# Patient Record
Sex: FEMALE | Race: BLACK OR AFRICAN AMERICAN | NOT HISPANIC OR LATINO | Employment: OTHER | RURAL
[De-identification: names, ages, dates, MRNs, and addresses within clinical notes are randomized per-mention and may not be internally consistent; named-entity substitution may affect disease eponyms.]

---

## 2020-10-15 ENCOUNTER — HISTORICAL (OUTPATIENT)
Dept: ADMINISTRATIVE | Facility: HOSPITAL | Age: 85
End: 2020-10-15

## 2020-10-15 LAB
ANION GAP SERPL CALCULATED.3IONS-SCNC: 9.7 MMOL/L (ref 7–16)
BASOPHILS # BLD AUTO: 0.04 X10E3/UL (ref 0–0.2)
BASOPHILS NFR BLD AUTO: 0.3 % (ref 0–1)
BUN SERPL-MCNC: 30 MG/DL (ref 7–18)
CALCIUM SERPL-MCNC: 9.5 MG/DL (ref 8.5–10.1)
CHLORIDE SERPL-SCNC: 107 MMOL/L (ref 98–107)
CO2 SERPL-SCNC: 26 MMOL/L (ref 21–32)
CREAT SERPL-MCNC: 1.96 MG/DL (ref 0.5–1.02)
EOSINOPHIL # BLD AUTO: 0.11 X10E3/UL (ref 0–0.5)
EOSINOPHIL NFR BLD AUTO: 0.9 % (ref 1–4)
ERYTHROCYTE [DISTWIDTH] IN BLOOD BY AUTOMATED COUNT: 13.6 % (ref 11.5–14.5)
GLUCOSE SERPL-MCNC: 128 MG/DL (ref 74–106)
HCT VFR BLD AUTO: 41.2 % (ref 38–47)
HGB BLD-MCNC: 12.7 G/DL (ref 12–16)
IMM GRANULOCYTES # BLD AUTO: 0.06 X10E3/UL (ref 0–0.04)
IMM GRANULOCYTES NFR BLD: 0.5 % (ref 0–0.4)
LYMPHOCYTES # BLD AUTO: 1.47 X10E3/UL (ref 1–4.8)
LYMPHOCYTES NFR BLD AUTO: 11.4 % (ref 27–41)
MCH RBC QN AUTO: 30.5 PG (ref 27–31)
MCHC RBC AUTO-ENTMCNC: 30.8 G/DL (ref 32–36)
MCV RBC AUTO: 98.8 FL (ref 80–96)
MONOCYTES # BLD AUTO: 0.72 X10E3/UL (ref 0–0.8)
MONOCYTES NFR BLD AUTO: 5.6 % (ref 2–6)
MPC BLD CALC-MCNC: 9.3 FL (ref 9.4–12.4)
NEUTROPHILS # BLD AUTO: 10.44 X10E3/UL (ref 1.8–7.7)
NEUTROPHILS NFR BLD AUTO: 81.3 % (ref 53–65)
NRBC # BLD AUTO: 0 X10E3/UL (ref 0–0)
NRBC, AUTO (.00): 0 /100 (ref 0–0)
PLATELET # BLD AUTO: 288 X10E3/UL (ref 150–400)
POTASSIUM SERPL-SCNC: 3.7 MMOL/L (ref 3.5–5.1)
RBC # BLD AUTO: 4.17 X10E6/UL (ref 4.2–5.4)
SODIUM SERPL-SCNC: 139 MMOL/L (ref 136–145)
WBC # BLD AUTO: 12.84 X10E3/UL (ref 4.5–11)

## 2020-10-29 LAB
EST. AVERAGE GLUCOSE BLD GHB EST-MCNC: 130 MG/DL
HBA1C MFR BLD HPLC: 6.5 % (ref 4.5–6.6)

## 2021-05-13 DIAGNOSIS — M25.551 PAIN IN RIGHT HIP: Primary | ICD-10-CM

## 2021-05-17 ENCOUNTER — HOSPITAL ENCOUNTER (OUTPATIENT)
Dept: RADIOLOGY | Facility: HOSPITAL | Age: 86
Discharge: HOME | End: 2021-05-17
Attending: ORTHOPAEDIC SURGERY
Payer: MEDICARE

## 2021-05-17 ENCOUNTER — OFFICE VISIT (OUTPATIENT)
Dept: SPINE | Facility: CLINIC | Age: 86
End: 2021-05-17
Payer: MEDICARE

## 2021-05-17 DIAGNOSIS — Z96.641 HISTORY OF HEMIARTHROPLASTY OF RIGHT HIP: Primary | ICD-10-CM

## 2021-05-17 DIAGNOSIS — M25.551 PAIN IN RIGHT HIP: ICD-10-CM

## 2021-05-17 PROCEDURE — 99213 OFFICE O/P EST LOW 20 MIN: CPT | Mod: S$PBB,,, | Performed by: ORTHOPAEDIC SURGERY

## 2021-05-17 PROCEDURE — 73502 X-RAY EXAM HIP UNI 2-3 VIEWS: CPT | Mod: 26,RT,, | Performed by: ORTHOPAEDIC SURGERY

## 2021-05-17 PROCEDURE — 99213 PR OFFICE/OUTPT VISIT, EST, LEVL III, 20-29 MIN: ICD-10-PCS | Mod: S$PBB,,, | Performed by: ORTHOPAEDIC SURGERY

## 2021-05-17 PROCEDURE — 73502 XR HIP 2 VIEW RIGHT: ICD-10-PCS | Mod: 26,RT,, | Performed by: ORTHOPAEDIC SURGERY

## 2021-05-17 PROCEDURE — 99212 OFFICE O/P EST SF 10 MIN: CPT | Mod: PBBFAC,25 | Performed by: ORTHOPAEDIC SURGERY

## 2021-05-17 PROCEDURE — 73502 X-RAY EXAM HIP UNI 2-3 VIEWS: CPT | Mod: TC,RT

## 2021-05-17 RX ORDER — DILTIAZEM HYDROCHLORIDE 300 MG/1
300 CAPSULE, COATED, EXTENDED RELEASE ORAL DAILY
COMMUNITY
Start: 2021-03-10 | End: 2021-06-07 | Stop reason: SDUPTHER

## 2021-05-17 RX ORDER — LISINOPRIL AND HYDROCHLOROTHIAZIDE 12.5; 2 MG/1; MG/1
1 TABLET ORAL DAILY
COMMUNITY
Start: 2021-04-23 | End: 2021-06-07 | Stop reason: SDUPTHER

## 2021-05-17 RX ORDER — PANTOPRAZOLE SODIUM 40 MG/1
40 TABLET, DELAYED RELEASE ORAL DAILY
COMMUNITY
Start: 2021-03-09 | End: 2021-06-07 | Stop reason: SDUPTHER

## 2021-06-07 ENCOUNTER — OFFICE VISIT (OUTPATIENT)
Dept: FAMILY MEDICINE | Facility: CLINIC | Age: 86
End: 2021-06-07
Payer: MEDICARE

## 2021-06-07 VITALS
BODY MASS INDEX: 34.63 KG/M2 | RESPIRATION RATE: 18 BRPM | HEIGHT: 62 IN | DIASTOLIC BLOOD PRESSURE: 84 MMHG | OXYGEN SATURATION: 97 % | SYSTOLIC BLOOD PRESSURE: 138 MMHG | HEART RATE: 93 BPM | TEMPERATURE: 98 F | WEIGHT: 188.19 LBS

## 2021-06-07 DIAGNOSIS — H54.8 LEGALLY BLIND: ICD-10-CM

## 2021-06-07 DIAGNOSIS — H91.90 HEARING LOSS, UNSPECIFIED HEARING LOSS TYPE, UNSPECIFIED LATERALITY: ICD-10-CM

## 2021-06-07 DIAGNOSIS — M25.551 RIGHT HIP PAIN: ICD-10-CM

## 2021-06-07 DIAGNOSIS — F32.5 MAJOR DEPRESSIVE DISORDER WITH SINGLE EPISODE, IN FULL REMISSION: ICD-10-CM

## 2021-06-07 DIAGNOSIS — K21.9 GASTROESOPHAGEAL REFLUX DISEASE WITHOUT ESOPHAGITIS: ICD-10-CM

## 2021-06-07 DIAGNOSIS — I10 ESSENTIAL HYPERTENSION: Primary | ICD-10-CM

## 2021-06-07 LAB
ALBUMIN SERPL BCP-MCNC: 3.7 G/DL (ref 3.5–5)
ALBUMIN/GLOB SERPL: 0.9 {RATIO}
ALP SERPL-CCNC: 92 U/L (ref 55–142)
ALT SERPL W P-5'-P-CCNC: 16 U/L (ref 13–56)
ANION GAP SERPL CALCULATED.3IONS-SCNC: 11 MMOL/L (ref 7–16)
AST SERPL W P-5'-P-CCNC: 16 U/L (ref 15–37)
BASOPHILS # BLD AUTO: 0.02 K/UL (ref 0–0.2)
BASOPHILS NFR BLD AUTO: 0.3 % (ref 0–1)
BILIRUB SERPL-MCNC: 0.5 MG/DL (ref 0–1.2)
BUN SERPL-MCNC: 22 MG/DL (ref 7–18)
BUN/CREAT SERPL: 14 (ref 6–20)
CALCIUM SERPL-MCNC: 9.1 MG/DL (ref 8.5–10.1)
CHLORIDE SERPL-SCNC: 109 MMOL/L (ref 98–107)
CO2 SERPL-SCNC: 24 MMOL/L (ref 21–32)
CREAT SERPL-MCNC: 1.61 MG/DL (ref 0.55–1.02)
DIFFERENTIAL METHOD BLD: ABNORMAL
EOSINOPHIL # BLD AUTO: 0.07 K/UL (ref 0–0.5)
EOSINOPHIL NFR BLD AUTO: 1 % (ref 1–4)
ERYTHROCYTE [DISTWIDTH] IN BLOOD BY AUTOMATED COUNT: 14.5 % (ref 11.5–14.5)
GLOBULIN SER-MCNC: 4.1 G/DL (ref 2–4)
GLUCOSE SERPL-MCNC: 136 MG/DL (ref 74–106)
HCT VFR BLD AUTO: 40.6 % (ref 38–47)
HGB BLD-MCNC: 13.1 G/DL (ref 12–16)
IMM GRANULOCYTES # BLD AUTO: 0.01 K/UL (ref 0–0.04)
IMM GRANULOCYTES NFR BLD: 0.1 % (ref 0–0.4)
LYMPHOCYTES # BLD AUTO: 1.93 K/UL (ref 1–4.8)
LYMPHOCYTES NFR BLD AUTO: 28 % (ref 27–41)
MCH RBC QN AUTO: 31.3 PG (ref 27–31)
MCHC RBC AUTO-ENTMCNC: 32.3 G/DL (ref 32–36)
MCV RBC AUTO: 97.1 FL (ref 80–96)
MONOCYTES # BLD AUTO: 0.53 K/UL (ref 0–0.8)
MONOCYTES NFR BLD AUTO: 7.7 % (ref 2–6)
MPC BLD CALC-MCNC: 10.9 FL (ref 9.4–12.4)
NEUTROPHILS # BLD AUTO: 4.34 K/UL (ref 1.8–7.7)
NEUTROPHILS NFR BLD AUTO: 62.9 % (ref 53–65)
NRBC # BLD AUTO: 0 X10E3/UL
NRBC, AUTO (.00): 0 %
PLATELET # BLD AUTO: 222 K/UL (ref 150–400)
POTASSIUM SERPL-SCNC: 4.1 MMOL/L (ref 3.5–5.1)
PROT SERPL-MCNC: 7.8 G/DL (ref 6.4–8.2)
RBC # BLD AUTO: 4.18 M/UL (ref 4.2–5.4)
SODIUM SERPL-SCNC: 140 MMOL/L (ref 136–145)
WBC # BLD AUTO: 6.9 K/UL (ref 4.5–11)

## 2021-06-07 PROCEDURE — 85025 COMPLETE CBC W/AUTO DIFF WBC: CPT | Mod: ,,, | Performed by: CLINICAL MEDICAL LABORATORY

## 2021-06-07 PROCEDURE — 80053 COMPREHENSIVE METABOLIC PANEL: ICD-10-PCS | Mod: ,,, | Performed by: CLINICAL MEDICAL LABORATORY

## 2021-06-07 PROCEDURE — 99214 OFFICE O/P EST MOD 30 MIN: CPT | Mod: ,,, | Performed by: FAMILY MEDICINE

## 2021-06-07 PROCEDURE — 80053 COMPREHEN METABOLIC PANEL: CPT | Mod: ,,, | Performed by: CLINICAL MEDICAL LABORATORY

## 2021-06-07 PROCEDURE — 99214 PR OFFICE/OUTPT VISIT, EST, LEVL IV, 30-39 MIN: ICD-10-PCS | Mod: ,,, | Performed by: FAMILY MEDICINE

## 2021-06-07 PROCEDURE — 85025 CBC WITH DIFFERENTIAL: ICD-10-PCS | Mod: ,,, | Performed by: CLINICAL MEDICAL LABORATORY

## 2021-06-07 RX ORDER — CITALOPRAM 10 MG/1
10 TABLET ORAL DAILY
Qty: 90 TABLET | Refills: 3 | Status: SHIPPED | OUTPATIENT
Start: 2021-06-07 | End: 2021-11-04

## 2021-06-07 RX ORDER — FUROSEMIDE 20 MG/1
20 TABLET ORAL DAILY
Qty: 90 TABLET | Refills: 1 | Status: SHIPPED | OUTPATIENT
Start: 2021-06-07 | End: 2021-11-04

## 2021-06-07 RX ORDER — LIDOCAINE 50 MG/G
1 PATCH TOPICAL DAILY
Qty: 90 PATCH | Refills: 3 | Status: SHIPPED | OUTPATIENT
Start: 2021-06-07 | End: 2021-09-05

## 2021-06-07 RX ORDER — PANTOPRAZOLE SODIUM 40 MG/1
40 TABLET, DELAYED RELEASE ORAL DAILY
Qty: 90 TABLET | Refills: 1 | Status: SHIPPED | OUTPATIENT
Start: 2021-06-07 | End: 2021-12-13 | Stop reason: SDUPTHER

## 2021-06-07 RX ORDER — FUROSEMIDE 20 MG/1
20 TABLET ORAL DAILY
COMMUNITY
End: 2021-06-07 | Stop reason: SDUPTHER

## 2021-06-07 RX ORDER — POTASSIUM CHLORIDE 750 MG/1
10 CAPSULE, EXTENDED RELEASE ORAL ONCE
Qty: 90 CAPSULE | Refills: 1 | Status: SHIPPED | OUTPATIENT
Start: 2021-06-07 | End: 2021-06-07

## 2021-06-07 RX ORDER — LISINOPRIL AND HYDROCHLOROTHIAZIDE 12.5; 2 MG/1; MG/1
1 TABLET ORAL DAILY
Qty: 90 TABLET | Refills: 1 | Status: SHIPPED | OUTPATIENT
Start: 2021-06-07 | End: 2021-12-13 | Stop reason: SDUPTHER

## 2021-06-07 RX ORDER — DILTIAZEM HYDROCHLORIDE 300 MG/1
300 CAPSULE, COATED, EXTENDED RELEASE ORAL DAILY
Qty: 90 CAPSULE | Refills: 1 | Status: SHIPPED | OUTPATIENT
Start: 2021-06-07 | End: 2021-12-13 | Stop reason: SDUPTHER

## 2021-07-01 ENCOUNTER — PATIENT MESSAGE (OUTPATIENT)
Dept: ADMINISTRATIVE | Facility: OTHER | Age: 86
End: 2021-07-01

## 2021-10-18 ENCOUNTER — TELEPHONE (OUTPATIENT)
Dept: FAMILY MEDICINE | Facility: CLINIC | Age: 86
End: 2021-10-18
Payer: MEDICARE

## 2021-10-18 RX ORDER — AMOXICILLIN AND CLAVULANATE POTASSIUM 875; 125 MG/1; MG/1
1 TABLET, FILM COATED ORAL EVERY 12 HOURS
COMMUNITY
End: 2021-12-13

## 2021-10-18 RX ORDER — MULTIVIT,IRON,MINERALS/LUTEIN
1 TABLET ORAL DAILY
COMMUNITY

## 2021-11-04 ENCOUNTER — OFFICE VISIT (OUTPATIENT)
Dept: FAMILY MEDICINE | Facility: CLINIC | Age: 86
End: 2021-11-04
Payer: MEDICARE

## 2021-11-04 VITALS
WEIGHT: 178.56 LBS | RESPIRATION RATE: 18 BRPM | BODY MASS INDEX: 30.48 KG/M2 | DIASTOLIC BLOOD PRESSURE: 59 MMHG | HEIGHT: 64 IN | OXYGEN SATURATION: 97 % | SYSTOLIC BLOOD PRESSURE: 129 MMHG | HEART RATE: 66 BPM | TEMPERATURE: 98 F

## 2021-11-04 DIAGNOSIS — H54.8 LEGALLY BLIND: ICD-10-CM

## 2021-11-04 DIAGNOSIS — F02.818 LATE ONSET ALZHEIMER'S DEMENTIA WITH BEHAVIORAL DISTURBANCE: ICD-10-CM

## 2021-11-04 DIAGNOSIS — I10 ESSENTIAL HYPERTENSION: ICD-10-CM

## 2021-11-04 DIAGNOSIS — G30.1 LATE ONSET ALZHEIMER'S DEMENTIA WITH BEHAVIORAL DISTURBANCE: ICD-10-CM

## 2021-11-04 DIAGNOSIS — H70.91 MASTOIDITIS OF RIGHT SIDE: Primary | ICD-10-CM

## 2021-11-04 DIAGNOSIS — H91.90 HEARING LOSS, UNSPECIFIED HEARING LOSS TYPE, UNSPECIFIED LATERALITY: ICD-10-CM

## 2021-11-04 PROCEDURE — 99495 TCM SERVICES (MODERATE COMPLEXITY): ICD-10-PCS | Mod: ,,, | Performed by: FAMILY MEDICINE

## 2021-11-04 PROCEDURE — 99495 TRANSJ CARE MGMT MOD F2F 14D: CPT | Mod: ,,, | Performed by: FAMILY MEDICINE

## 2021-11-04 RX ORDER — CLINDAMYCIN HYDROCHLORIDE 300 MG/1
300 CAPSULE ORAL 3 TIMES DAILY
Qty: 15 CAPSULE | Refills: 0 | Status: SHIPPED | OUTPATIENT
Start: 2021-11-04 | End: 2021-11-09

## 2021-12-13 ENCOUNTER — OFFICE VISIT (OUTPATIENT)
Dept: FAMILY MEDICINE | Facility: CLINIC | Age: 86
End: 2021-12-13
Payer: MEDICARE

## 2021-12-13 VITALS
WEIGHT: 184.19 LBS | RESPIRATION RATE: 20 BRPM | SYSTOLIC BLOOD PRESSURE: 132 MMHG | OXYGEN SATURATION: 99 % | HEART RATE: 66 BPM | TEMPERATURE: 97 F | DIASTOLIC BLOOD PRESSURE: 62 MMHG | BODY MASS INDEX: 31.45 KG/M2 | HEIGHT: 64 IN

## 2021-12-13 DIAGNOSIS — K21.9 GASTROESOPHAGEAL REFLUX DISEASE WITHOUT ESOPHAGITIS: ICD-10-CM

## 2021-12-13 DIAGNOSIS — I10 ESSENTIAL HYPERTENSION: ICD-10-CM

## 2021-12-13 DIAGNOSIS — R73.9 HYPERGLYCEMIA: Primary | ICD-10-CM

## 2021-12-13 LAB
ALBUMIN SERPL BCP-MCNC: 3.2 G/DL (ref 3.5–5)
ALBUMIN/GLOB SERPL: 0.7 {RATIO}
ALP SERPL-CCNC: 87 U/L (ref 55–142)
ALT SERPL W P-5'-P-CCNC: 18 U/L (ref 13–56)
ANION GAP SERPL CALCULATED.3IONS-SCNC: 12 MMOL/L (ref 7–16)
AST SERPL W P-5'-P-CCNC: 18 U/L (ref 15–37)
BILIRUB SERPL-MCNC: 0.3 MG/DL (ref 0–1.2)
BUN SERPL-MCNC: 21 MG/DL (ref 7–18)
BUN/CREAT SERPL: 13 (ref 6–20)
CALCIUM SERPL-MCNC: 9 MG/DL (ref 8.5–10.1)
CHLORIDE SERPL-SCNC: 112 MMOL/L (ref 98–107)
CHOLEST SERPL-MCNC: 244 MG/DL (ref 0–200)
CHOLEST/HDLC SERPL: 4.9 {RATIO}
CO2 SERPL-SCNC: 23 MMOL/L (ref 21–32)
CREAT SERPL-MCNC: 1.6 MG/DL (ref 0.55–1.02)
EST. AVERAGE GLUCOSE BLD GHB EST-MCNC: 114 MG/DL
GLOBULIN SER-MCNC: 4.3 G/DL (ref 2–4)
GLUCOSE SERPL-MCNC: 129 MG/DL (ref 74–106)
HBA1C MFR BLD HPLC: 6 % (ref 4.5–6.6)
HDLC SERPL-MCNC: 50 MG/DL (ref 40–60)
LDLC SERPL CALC-MCNC: 166 MG/DL
LDLC/HDLC SERPL: 3.3 {RATIO}
NONHDLC SERPL-MCNC: 194 MG/DL
POTASSIUM SERPL-SCNC: 4.2 MMOL/L (ref 3.5–5.1)
PROT SERPL-MCNC: 7.5 G/DL (ref 6.4–8.2)
SODIUM SERPL-SCNC: 143 MMOL/L (ref 136–145)
TRIGL SERPL-MCNC: 140 MG/DL (ref 35–150)
VLDLC SERPL-MCNC: 28 MG/DL

## 2021-12-13 PROCEDURE — 99214 OFFICE O/P EST MOD 30 MIN: CPT | Mod: ,,, | Performed by: FAMILY MEDICINE

## 2021-12-13 PROCEDURE — 80053 COMPREHEN METABOLIC PANEL: CPT | Mod: ,,, | Performed by: CLINICAL MEDICAL LABORATORY

## 2021-12-13 PROCEDURE — 80061 LIPID PANEL: ICD-10-PCS | Mod: ,,, | Performed by: CLINICAL MEDICAL LABORATORY

## 2021-12-13 PROCEDURE — 99214 PR OFFICE/OUTPT VISIT, EST, LEVL IV, 30-39 MIN: ICD-10-PCS | Mod: ,,, | Performed by: FAMILY MEDICINE

## 2021-12-13 PROCEDURE — 83036 HEMOGLOBIN A1C: ICD-10-PCS | Mod: ,,, | Performed by: CLINICAL MEDICAL LABORATORY

## 2021-12-13 PROCEDURE — 83036 HEMOGLOBIN GLYCOSYLATED A1C: CPT | Mod: ,,, | Performed by: CLINICAL MEDICAL LABORATORY

## 2021-12-13 PROCEDURE — 80061 LIPID PANEL: CPT | Mod: ,,, | Performed by: CLINICAL MEDICAL LABORATORY

## 2021-12-13 PROCEDURE — 80053 COMPREHENSIVE METABOLIC PANEL: ICD-10-PCS | Mod: ,,, | Performed by: CLINICAL MEDICAL LABORATORY

## 2021-12-13 RX ORDER — PANTOPRAZOLE SODIUM 40 MG/1
40 TABLET, DELAYED RELEASE ORAL DAILY
Qty: 90 TABLET | Refills: 1 | Status: SHIPPED | OUTPATIENT
Start: 2021-12-13 | End: 2022-09-01 | Stop reason: SDUPTHER

## 2021-12-13 RX ORDER — DILTIAZEM HYDROCHLORIDE 300 MG/1
300 CAPSULE, COATED, EXTENDED RELEASE ORAL DAILY
Qty: 90 CAPSULE | Refills: 1 | Status: SHIPPED | OUTPATIENT
Start: 2021-12-13 | End: 2022-09-01 | Stop reason: SDUPTHER

## 2021-12-13 RX ORDER — LISINOPRIL AND HYDROCHLOROTHIAZIDE 12.5; 2 MG/1; MG/1
1 TABLET ORAL DAILY
Qty: 90 TABLET | Refills: 1 | Status: SHIPPED | OUTPATIENT
Start: 2021-12-13 | End: 2022-09-01 | Stop reason: SDUPTHER

## 2022-03-11 DIAGNOSIS — Z71.89 COMPLEX CARE COORDINATION: ICD-10-CM

## 2022-04-30 ENCOUNTER — EXTERNAL CHRONIC CARE MANAGEMENT (OUTPATIENT)
Dept: FAMILY MEDICINE | Facility: CLINIC | Age: 87
End: 2022-04-30
Payer: MEDICARE

## 2022-04-30 PROCEDURE — G0511 CCM/BHI BY RHC/FQHC 20MIN MO: HCPCS | Mod: ,,, | Performed by: FAMILY MEDICINE

## 2022-04-30 PROCEDURE — G0511 PR CHRONIC CARE MGMT, RHC OR FQHC ONLY, 20 MINS OR MORE: ICD-10-PCS | Mod: ,,, | Performed by: FAMILY MEDICINE

## 2022-05-31 ENCOUNTER — EXTERNAL CHRONIC CARE MANAGEMENT (OUTPATIENT)
Dept: FAMILY MEDICINE | Facility: CLINIC | Age: 87
End: 2022-05-31
Payer: MEDICARE

## 2022-05-31 PROCEDURE — G0511 PR CHRONIC CARE MGMT, RHC OR FQHC ONLY, 20 MINS OR MORE: ICD-10-PCS | Mod: ,,, | Performed by: FAMILY MEDICINE

## 2022-05-31 PROCEDURE — G0511 CCM/BHI BY RHC/FQHC 20MIN MO: HCPCS | Mod: ,,, | Performed by: FAMILY MEDICINE

## 2022-06-09 ENCOUNTER — TELEPHONE (OUTPATIENT)
Dept: FAMILY MEDICINE | Facility: CLINIC | Age: 87
End: 2022-06-09
Payer: MEDICARE

## 2022-06-09 NOTE — TELEPHONE ENCOUNTER
Yuli Sanders Staff  Phone Number: [phone]     Good morning,     I spoke with Ajay's daughter, Rach, this morning and completed Ajay's Health Assessment and leda monthly health check call. Since Dr. Huddleston is no longer at the practice, she would like to know which provider Ajay will be assigned to.  Could someone please call to discuss with Rach.     Thank you and please let me know if you have any questions.     Yuli Peñaloza LPN   Chronic Care Coordinator   [facility]   [phone] Ext. [phone]

## 2022-06-30 ENCOUNTER — EXTERNAL CHRONIC CARE MANAGEMENT (OUTPATIENT)
Dept: FAMILY MEDICINE | Facility: CLINIC | Age: 87
End: 2022-06-30
Payer: MEDICARE

## 2022-06-30 PROCEDURE — G0511 CCM/BHI BY RHC/FQHC 20MIN MO: HCPCS | Mod: ,,, | Performed by: FAMILY MEDICINE

## 2022-06-30 PROCEDURE — G0511 PR CHRONIC CARE MGMT, RHC OR FQHC ONLY, 20 MINS OR MORE: ICD-10-PCS | Mod: ,,, | Performed by: FAMILY MEDICINE

## 2022-07-31 ENCOUNTER — EXTERNAL CHRONIC CARE MANAGEMENT (OUTPATIENT)
Dept: FAMILY MEDICINE | Facility: CLINIC | Age: 87
End: 2022-07-31
Payer: MEDICARE

## 2022-07-31 PROCEDURE — G0511 CCM/BHI BY RHC/FQHC 20MIN MO: HCPCS | Mod: ,,, | Performed by: FAMILY MEDICINE

## 2022-07-31 PROCEDURE — G0511 PR CHRONIC CARE MGMT, RHC OR FQHC ONLY, 20 MINS OR MORE: ICD-10-PCS | Mod: ,,, | Performed by: FAMILY MEDICINE

## 2022-08-31 ENCOUNTER — EXTERNAL CHRONIC CARE MANAGEMENT (OUTPATIENT)
Dept: FAMILY MEDICINE | Facility: CLINIC | Age: 87
End: 2022-08-31
Payer: MEDICARE

## 2022-08-31 PROCEDURE — G0511 CCM/BHI BY RHC/FQHC 20MIN MO: HCPCS | Mod: ,,, | Performed by: FAMILY MEDICINE

## 2022-08-31 PROCEDURE — G0511 PR CHRONIC CARE MGMT, RHC OR FQHC ONLY, 20 MINS OR MORE: ICD-10-PCS | Mod: ,,, | Performed by: FAMILY MEDICINE

## 2022-09-01 ENCOUNTER — OFFICE VISIT (OUTPATIENT)
Dept: FAMILY MEDICINE | Facility: CLINIC | Age: 87
End: 2022-09-01
Payer: MEDICARE

## 2022-09-01 VITALS
DIASTOLIC BLOOD PRESSURE: 64 MMHG | HEART RATE: 82 BPM | HEIGHT: 64 IN | OXYGEN SATURATION: 98 % | TEMPERATURE: 98 F | WEIGHT: 172.81 LBS | SYSTOLIC BLOOD PRESSURE: 110 MMHG | BODY MASS INDEX: 29.5 KG/M2

## 2022-09-01 DIAGNOSIS — R73.09 ELEVATED HEMOGLOBIN A1C: ICD-10-CM

## 2022-09-01 DIAGNOSIS — K21.9 GASTROESOPHAGEAL REFLUX DISEASE WITHOUT ESOPHAGITIS: ICD-10-CM

## 2022-09-01 DIAGNOSIS — I10 ESSENTIAL HYPERTENSION: Primary | ICD-10-CM

## 2022-09-01 LAB
ALBUMIN SERPL BCP-MCNC: 3.5 G/DL (ref 3.5–5)
ALBUMIN/GLOB SERPL: 0.8 {RATIO}
ALP SERPL-CCNC: 92 U/L (ref 55–142)
ALT SERPL W P-5'-P-CCNC: 19 U/L (ref 13–56)
ANION GAP SERPL CALCULATED.3IONS-SCNC: 13 MMOL/L (ref 7–16)
AST SERPL W P-5'-P-CCNC: 18 U/L (ref 15–37)
BASOPHILS # BLD AUTO: 0.01 K/UL (ref 0–0.2)
BASOPHILS NFR BLD AUTO: 0.1 % (ref 0–1)
BILIRUB SERPL-MCNC: 0.4 MG/DL (ref ?–1.2)
BUN SERPL-MCNC: 20 MG/DL (ref 7–18)
BUN/CREAT SERPL: 14 (ref 6–20)
CALCIUM SERPL-MCNC: 9.3 MG/DL (ref 8.5–10.1)
CHLORIDE SERPL-SCNC: 109 MMOL/L (ref 98–107)
CHOLEST SERPL-MCNC: 232 MG/DL (ref 0–200)
CHOLEST/HDLC SERPL: 4.5 {RATIO}
CO2 SERPL-SCNC: 23 MMOL/L (ref 21–32)
CREAT SERPL-MCNC: 1.46 MG/DL (ref 0.55–1.02)
DIFFERENTIAL METHOD BLD: ABNORMAL
EGFR (NO RACE VARIABLE) (RUSH/TITUS): 32 ML/MIN/1.73M²
EOSINOPHIL # BLD AUTO: 0.04 K/UL (ref 0–0.5)
EOSINOPHIL NFR BLD AUTO: 0.6 % (ref 1–4)
ERYTHROCYTE [DISTWIDTH] IN BLOOD BY AUTOMATED COUNT: 14.5 % (ref 11.5–14.5)
EST. AVERAGE GLUCOSE BLD GHB EST-MCNC: 110 MG/DL
GLOBULIN SER-MCNC: 4.3 G/DL (ref 2–4)
GLUCOSE SERPL-MCNC: 129 MG/DL (ref 74–106)
HBA1C MFR BLD HPLC: 5.9 % (ref 4.5–6.6)
HCT VFR BLD AUTO: 41.9 % (ref 38–47)
HDLC SERPL-MCNC: 52 MG/DL (ref 40–60)
HGB BLD-MCNC: 13.8 G/DL (ref 12–16)
IMM GRANULOCYTES # BLD AUTO: 0.02 K/UL (ref 0–0.04)
IMM GRANULOCYTES NFR BLD: 0.3 % (ref 0–0.4)
LDLC SERPL CALC-MCNC: 150 MG/DL
LDLC/HDLC SERPL: 2.9 {RATIO}
LYMPHOCYTES # BLD AUTO: 2.07 K/UL (ref 1–4.8)
LYMPHOCYTES NFR BLD AUTO: 30.8 % (ref 27–41)
MCH RBC QN AUTO: 32.9 PG (ref 27–31)
MCHC RBC AUTO-ENTMCNC: 32.9 G/DL (ref 32–36)
MCV RBC AUTO: 100 FL (ref 80–96)
MONOCYTES # BLD AUTO: 0.43 K/UL (ref 0–0.8)
MONOCYTES NFR BLD AUTO: 6.4 % (ref 2–6)
MPC BLD CALC-MCNC: 10.7 FL (ref 9.4–12.4)
NEUTROPHILS # BLD AUTO: 4.15 K/UL (ref 1.8–7.7)
NEUTROPHILS NFR BLD AUTO: 61.8 % (ref 53–65)
NONHDLC SERPL-MCNC: 180 MG/DL
NRBC # BLD AUTO: 0 X10E3/UL
NRBC, AUTO (.00): 0 %
PLATELET # BLD AUTO: 234 K/UL (ref 150–400)
POTASSIUM SERPL-SCNC: 4 MMOL/L (ref 3.5–5.1)
PROT SERPL-MCNC: 7.8 G/DL (ref 6.4–8.2)
RBC # BLD AUTO: 4.19 M/UL (ref 4.2–5.4)
SODIUM SERPL-SCNC: 141 MMOL/L (ref 136–145)
TRIGL SERPL-MCNC: 151 MG/DL (ref 35–150)
VLDLC SERPL-MCNC: 30 MG/DL
WBC # BLD AUTO: 6.72 K/UL (ref 4.5–11)

## 2022-09-01 PROCEDURE — 83036 HEMOGLOBIN A1C: ICD-10-PCS | Mod: ,,, | Performed by: CLINICAL MEDICAL LABORATORY

## 2022-09-01 PROCEDURE — 99213 PR OFFICE/OUTPT VISIT, EST, LEVL III, 20-29 MIN: ICD-10-PCS | Mod: ,,, | Performed by: NURSE PRACTITIONER

## 2022-09-01 PROCEDURE — 83036 HEMOGLOBIN GLYCOSYLATED A1C: CPT | Mod: ,,, | Performed by: CLINICAL MEDICAL LABORATORY

## 2022-09-01 PROCEDURE — 80061 LIPID PANEL: ICD-10-PCS | Mod: ,,, | Performed by: CLINICAL MEDICAL LABORATORY

## 2022-09-01 PROCEDURE — 99213 OFFICE O/P EST LOW 20 MIN: CPT | Mod: ,,, | Performed by: NURSE PRACTITIONER

## 2022-09-01 PROCEDURE — 80053 COMPREHEN METABOLIC PANEL: CPT | Mod: ,,, | Performed by: CLINICAL MEDICAL LABORATORY

## 2022-09-01 PROCEDURE — 85025 COMPLETE CBC W/AUTO DIFF WBC: CPT | Mod: ,,, | Performed by: CLINICAL MEDICAL LABORATORY

## 2022-09-01 PROCEDURE — 85025 CBC WITH DIFFERENTIAL: ICD-10-PCS | Mod: ,,, | Performed by: CLINICAL MEDICAL LABORATORY

## 2022-09-01 PROCEDURE — 80053 COMPREHENSIVE METABOLIC PANEL: ICD-10-PCS | Mod: ,,, | Performed by: CLINICAL MEDICAL LABORATORY

## 2022-09-01 PROCEDURE — 80061 LIPID PANEL: CPT | Mod: ,,, | Performed by: CLINICAL MEDICAL LABORATORY

## 2022-09-01 RX ORDER — PANTOPRAZOLE SODIUM 40 MG/1
40 TABLET, DELAYED RELEASE ORAL DAILY
Qty: 90 TABLET | Refills: 1 | Status: SHIPPED | OUTPATIENT
Start: 2022-09-01 | End: 2022-10-13 | Stop reason: SDUPTHER

## 2022-09-01 RX ORDER — LISINOPRIL AND HYDROCHLOROTHIAZIDE 12.5; 2 MG/1; MG/1
1 TABLET ORAL DAILY
Qty: 90 TABLET | Refills: 1 | Status: SHIPPED | OUTPATIENT
Start: 2022-09-01 | End: 2022-10-13 | Stop reason: SDUPTHER

## 2022-09-01 RX ORDER — DILTIAZEM HYDROCHLORIDE 300 MG/1
300 CAPSULE, COATED, EXTENDED RELEASE ORAL DAILY
Qty: 90 CAPSULE | Refills: 1 | Status: SHIPPED | OUTPATIENT
Start: 2022-09-01 | End: 2022-10-13 | Stop reason: SDUPTHER

## 2022-09-01 NOTE — PROGRESS NOTES
BEA Szymanski   [facility] [address] [place] [address]  [phone]      PATIENT NAME: Ajay Bass  : 1922  DATE: 22  MRN: [id number]      Billing Provider: BEA Szymanski  Level of Service:   Patient PCP Information       Provider PCP Type    BEA Szymanski General            Reason for Visit / Chief Complaint: Medication Refill       Update PCP  Update Chief Complaint         History of Present Illness / Problem Focused Workflow     89 year old female presents for medication refills  Daughter denies any problems with current medications or complaints  She lives with patient and is caregiver  Ms Bass is pleasant, seems to be doing well; she is hard of hearing    Hx of  GERD, hypertension, DM    Review of Systems     Review of Systems   Constitutional:  Negative for chills and fever.   HENT:  Positive for hearing loss. Negative for congestion.    Eyes:         Decreased vision related to age   Respiratory:  Negative for cough and shortness of breath.    Cardiovascular:  Negative for chest pain.   Endocrine: Negative for polydipsia and polyuria.   Musculoskeletal:  Positive for arthralgias and gait problem.        Currently in wheelchair; limited mobility     Allergic/Immunologic: Negative for environmental allergies.   Neurological:  Negative for dizziness and headaches.   Psychiatric/Behavioral:  Negative for agitation and dysphoric mood.      Medical / Social / Family History     Past Medical History:   Diagnosis Date    Diabetes mellitus, type 2     GERD (gastroesophageal reflux disease)     Hypertension        Past Surgical History:   Procedure Laterality Date    TOTAL HIP ARTHROPLASTY         Social History  Ms.  reports that she has never smoked. She has never used smokeless tobacco. She reports that she does not drink alcohol and does not use drugs.    Family History  Ms.'s family history is not on  file.    Medications and Allergies     Medications  Outpatient Medications Marked as Taking for the 9/1/22 encounter (Office Visit) with BEA Szymanski   Medication Sig Dispense Refill    multivit-min-iron-FA-lutein (CENTRUM SILVER WOMEN) 8 mg iron-400 mcg-300 mcg Tab Take 1 tablet by mouth once daily.         Allergies  Review of patient's allergies indicates:  No Known Allergies    Physical Examination     Vitals:    09/01/22 0930   BP: 110/64   Pulse: 82   Temp: 98.1 °F (36.7 °C)     Physical Exam  Constitutional:       General: She is not in acute distress.  HENT:      Head: Atraumatic.      Nose: Nose normal.      Mouth/Throat:      Mouth: Mucous membranes are moist.   Eyes:      Extraocular Movements: Extraocular movements intact.   Cardiovascular:      Rate and Rhythm: Normal rate.   Pulmonary:      Effort: Pulmonary effort is normal. No respiratory distress.   Abdominal:      General: Bowel sounds are normal.      Palpations: Abdomen is soft.      Tenderness: There is no abdominal tenderness.   Musculoskeletal:         General: Normal range of motion.      Cervical back: Neck supple.   Skin:     General: Skin is warm.   Neurological:      Mental Status: She is alert and oriented to person, place, and time.   Psychiatric:         Mood and Affect: Mood normal.         Imaging / Labs     Office Visit on 09/01/2022   Component Date Value Ref Range Status    Hemoglobin A1C 09/01/2022 5.9  4.5 - 6.6 % Final    Estimated Average Glucose 09/01/2022 110  mg/dL Final    Triglycerides 09/01/2022 151 (H)  35 - 150 mg/dL Final    Cholesterol 09/01/2022 232 (H)  0 - 200 mg/dL Final    HDL Cholesterol 09/01/2022 52  40 - 60 mg/dL Final    Cholesterol/HDL Ratio (Risk Factor) 09/01/2022 4.5   Final    Non-HDL 09/01/2022 180  mg/dL Final    LDL Calculated 09/01/2022 150  mg/dL Final    LDL/HDL 09/01/2022 2.9   Final    VLDL 09/01/2022 30  mg/dL Final    Sodium 09/01/2022 141  136 - 145 mmol/L Final    Potassium 09/01/2022  4.0  3.5 - 5.1 mmol/L Final    Chloride 09/01/2022 109 (H)  98 - 107 mmol/L Final    CO2 09/01/2022 23  21 - 32 mmol/L Final    Anion Gap 09/01/2022 13  7 - 16 mmol/L Final    Glucose 09/01/2022 129 (H)  74 - 106 mg/dL Final    BUN 09/01/2022 20 (H)  7 - 18 mg/dL Final    Creatinine 09/01/2022 1.46 (H)  0.55 - 1.02 mg/dL Final    BUN/Creatinine Ratio 09/01/2022 14  6 - 20 Final    Calcium 09/01/2022 9.3  8.5 - 10.1 mg/dL Final    Total Protein 09/01/2022 7.8  6.4 - 8.2 g/dL Final    Albumin 09/01/2022 3.5  3.5 - 5.0 g/dL Final    Globulin 09/01/2022 4.3 (H)  2.0 - 4.0 g/dL Final    A/G Ratio 09/01/2022 0.8   Final    Bilirubin, Total 09/01/2022 0.4  >0.0 - 1.2 mg/dL Final    Alk Phos 09/01/2022 92  55 - 142 U/L Final    ALT 09/01/2022 19  13 - 56 U/L Final    AST 09/01/2022 18  15 - 37 U/L Final    eGFR 09/01/2022 32 (L)  >=60 mL/min/1.73m² Final    WBC 09/01/2022 6.72  4.50 - 11.00 K/uL Final    RBC 09/01/2022 4.19 (L)  4.20 - 5.40 M/uL Final    Hemoglobin 09/01/2022 13.8  12.0 - 16.0 g/dL Final    Hematocrit 09/01/2022 41.9  38.0 - 47.0 % Final    MCV 09/01/2022 100.0 (H)  80.0 - 96.0 fL Final    MCH 09/01/2022 32.9 (H)  27.0 - 31.0 pg Final    MCHC 09/01/2022 32.9  32.0 - 36.0 g/dL Final    RDW 09/01/2022 14.5  11.5 - 14.5 % Final    Platelet Count 09/01/2022 234  150 - 400 K/uL Final    MPV 09/01/2022 10.7  9.4 - 12.4 fL Final    Neutrophils % 09/01/2022 61.8  53.0 - 65.0 % Final    Lymphocytes % 09/01/2022 30.8  27.0 - 41.0 % Final    Monocytes % 09/01/2022 6.4 (H)  2.0 - 6.0 % Final    Eosinophils % 09/01/2022 0.6 (L)  1.0 - 4.0 % Final    Basophils % 09/01/2022 0.1  0.0 - 1.0 % Final    Immature Granulocytes % 09/01/2022 0.3  0.0 - 0.4 % Final    nRBC, Auto 09/01/2022 0.0  <=0.0 % Final    Neutrophils, Abs 09/01/2022 4.15  1.80 - 7.70 K/uL Final    Lymphocytes, Absolute 09/01/2022 2.07  1.00 - 4.80 K/uL Final    Monocytes, Absolute 09/01/2022 0.43  0.00 - 0.80 K/uL Final    Eosinophils, Absolute 09/01/2022  0.04  0.00 - 0.50 K/uL Final    Basophils, Absolute 09/01/2022 0.01  0.00 - 0.20 K/uL Final    Immature Granulocytes, Absolute 09/01/2022 0.02  0.00 - 0.04 K/uL Final    nRBC, Absolute 09/01/2022 0.00  <=0.00 x10e3/uL Final    Diff Type 09/01/2022 Auto   Final     X-Ray Hip 2 View Right  See Procedure Notes for results.     IMPRESSION: Please see Ortho procedure notes for report.      This procedure was auto-finalized by: Virtual Radiologist    Assessment and Plan (including Health Maintenance)      Problem List  Smart Sets  Document Outside HM   :    Health Maintenance Due   Topic Date Due    COVID-19 Vaccine (1) Never done    TETANUS VACCINE  Never done    Shingles Vaccine (1 of 2) Never done    Pneumococcal Vaccines (Age 65+) (1 - PCV) Never done    Influenza Vaccine (1) Never done       Problem List Items Addressed This Visit          Cardiac/Vascular    Essential hypertension - Primary    Relevant Medications    lisinopriL-hydrochlorothiazide (PRINZIDE,ZESTORETIC) 20-12.5 mg per tablet    diltiaZEM (CARDIZEM CD) 300 MG 24 hr capsule    Other Relevant Orders    Lipid Panel    CBC Auto Differential    Comprehensive Metabolic Panel       GI    Gastroesophageal reflux disease without esophagitis    Relevant Medications    pantoprazole (PROTONIX) 40 MG tablet    Other Relevant Orders    CBC Auto Differential     Other Visit Diagnoses       Elevated hemoglobin A1c        Relevant Orders    Hemoglobin A1C          Refill current medications  Labs today  Will treat as indicated when labs return  Follow up 6 mo    Health Maintenance Topics with due status: Not Due       Topic Last Completion Date    Lipid Panel 12/13/2021             Signature:  BEA Szymanski  [facility] [address] [place] [address]  [phone]    Date of encounter: 9/1/22

## 2022-09-23 ENCOUNTER — HOSPITAL ENCOUNTER (INPATIENT)
Facility: HOSPITAL | Age: 87
LOS: 4 days | Discharge: NURSING, REHAB OR LONG-TERM CARE | DRG: 177 | End: 2022-09-27
Attending: HOSPITALIST | Admitting: HOSPITALIST
Payer: MEDICARE

## 2022-09-23 DIAGNOSIS — J18.9 PNEUMONIA OF RIGHT LOWER LOBE DUE TO INFECTIOUS ORGANISM: Primary | ICD-10-CM

## 2022-09-23 DIAGNOSIS — J18.9 PNEUMONIA DUE TO INFECTIOUS ORGANISM, UNSPECIFIED LATERALITY, UNSPECIFIED PART OF LUNG: ICD-10-CM

## 2022-09-23 DIAGNOSIS — R53.1 WEAKNESS: ICD-10-CM

## 2022-09-23 DIAGNOSIS — J18.9 PNEUMONIA OF BOTH LOWER LOBES DUE TO INFECTIOUS ORGANISM: ICD-10-CM

## 2022-09-23 LAB
ALBUMIN SERPL BCP-MCNC: 4.1 G/DL (ref 3.5–5)
ALBUMIN/GLOB SERPL: 1.1 {RATIO}
ALP SERPL-CCNC: 113 U/L (ref 55–142)
ALT SERPL W P-5'-P-CCNC: 22 U/L (ref 13–56)
ANION GAP SERPL CALCULATED.3IONS-SCNC: 16 MMOL/L (ref 7–16)
AST SERPL W P-5'-P-CCNC: 34 U/L (ref 15–37)
BASOPHILS # BLD AUTO: 0 K/UL (ref 0–0.2)
BASOPHILS NFR BLD AUTO: 0 % (ref 0–1)
BILIRUB SERPL-MCNC: 0.6 MG/DL (ref ?–1.2)
BILIRUB UR QL STRIP: NEGATIVE
BUN SERPL-MCNC: 21 MG/DL (ref 7–18)
BUN/CREAT SERPL: 11 (ref 6–20)
CALCIUM SERPL-MCNC: 9.3 MG/DL (ref 8.5–10.1)
CHLORIDE SERPL-SCNC: 100 MMOL/L (ref 98–107)
CLARITY UR: CLEAR
CO2 SERPL-SCNC: 23 MMOL/L (ref 21–32)
COLOR UR: YELLOW
CREAT SERPL-MCNC: 1.92 MG/DL (ref 0.55–1.02)
DIFFERENTIAL METHOD BLD: ABNORMAL
EGFR (NO RACE VARIABLE) (RUSH/TITUS): 23 ML/MIN/1.73M²
EOSINOPHIL # BLD AUTO: 0 K/UL (ref 0–0.5)
EOSINOPHIL NFR BLD AUTO: 0 % (ref 1–4)
ERYTHROCYTE [DISTWIDTH] IN BLOOD BY AUTOMATED COUNT: 13.6 % (ref 11.5–14.5)
FLUAV AG UPPER RESP QL IA.RAPID: NEGATIVE
FLUBV AG UPPER RESP QL IA.RAPID: NEGATIVE
GLOBULIN SER-MCNC: 3.8 G/DL (ref 2–4)
GLUCOSE SERPL-MCNC: 216 MG/DL (ref 74–106)
GLUCOSE UR STRIP-MCNC: NEGATIVE MG/DL
HCT VFR BLD AUTO: 45.6 % (ref 38–47)
HGB BLD-MCNC: 15.4 G/DL (ref 12–16)
KETONES UR STRIP-SCNC: NEGATIVE MG/DL
LEUKOCYTE ESTERASE UR QL STRIP: NEGATIVE
LYMPHOCYTES # BLD AUTO: 0.49 K/UL (ref 1–4.8)
LYMPHOCYTES NFR BLD AUTO: 5.3 % (ref 27–41)
LYMPHOCYTES NFR BLD MANUAL: 5 % (ref 27–41)
MCH RBC QN AUTO: 32.1 PG (ref 27–31)
MCHC RBC AUTO-ENTMCNC: 33.8 G/DL (ref 32–36)
MCV RBC AUTO: 95 FL (ref 80–96)
MONOCYTES # BLD AUTO: 0.52 K/UL (ref 0–0.8)
MONOCYTES NFR BLD AUTO: 5.6 % (ref 2–6)
MONOCYTES NFR BLD MANUAL: 4 % (ref 2–6)
MPC BLD CALC-MCNC: 10.6 FL (ref 9.4–12.4)
NEUTROPHILS # BLD AUTO: 8.3 K/UL (ref 1.8–7.7)
NEUTROPHILS NFR BLD AUTO: 89.1 % (ref 53–65)
NEUTS BAND NFR BLD MANUAL: 5 % (ref 1–5)
NEUTS SEG NFR BLD MANUAL: 86 % (ref 50–62)
NITRITE UR QL STRIP: NEGATIVE
NRBC BLD MANUAL-RTO: ABNORMAL %
PH UR STRIP: 5.5 PH UNITS
PLATELET # BLD AUTO: 157 K/UL (ref 150–400)
POTASSIUM SERPL-SCNC: 4.3 MMOL/L (ref 3.5–5.1)
PROT SERPL-MCNC: 7.9 G/DL (ref 6.4–8.2)
PROT UR QL STRIP: NEGATIVE
RAPID GROUP A STREP: NEGATIVE
RBC # BLD AUTO: 4.8 M/UL (ref 4.2–5.4)
RBC # UR STRIP: NEGATIVE /UL
SARS-COV+SARS-COV-2 AG RESP QL IA.RAPID: POSITIVE
SARS-COV+SARS-COV-2 AG RESP QL IA.RAPID: POSITIVE
SODIUM SERPL-SCNC: 135 MMOL/L (ref 136–145)
SP GR UR STRIP: 1.02
UROBILINOGEN UR STRIP-ACNC: 0.2 MG/DL
WBC # BLD AUTO: 9.31 K/UL (ref 4.5–11)

## 2022-09-23 PROCEDURE — 81003 URINALYSIS AUTO W/O SCOPE: CPT | Performed by: REGISTERED NURSE

## 2022-09-23 PROCEDURE — 36592 COLLECT BLOOD FROM PICC: CPT | Performed by: REGISTERED NURSE

## 2022-09-23 PROCEDURE — 93010 EKG 12-LEAD: ICD-10-PCS | Mod: ,,, | Performed by: INTERNAL MEDICINE

## 2022-09-23 PROCEDURE — 87880 STREP A ASSAY W/OPTIC: CPT | Performed by: REGISTERED NURSE

## 2022-09-23 PROCEDURE — 99285 EMERGENCY DEPT VISIT HI MDM: CPT | Mod: GF | Performed by: REGISTERED NURSE

## 2022-09-23 PROCEDURE — 96365 THER/PROPH/DIAG IV INF INIT: CPT

## 2022-09-23 PROCEDURE — 93005 ELECTROCARDIOGRAM TRACING: CPT

## 2022-09-23 PROCEDURE — 36415 COLL VENOUS BLD VENIPUNCTURE: CPT | Performed by: REGISTERED NURSE

## 2022-09-23 PROCEDURE — 87426 SARSCOV CORONAVIRUS AG IA: CPT | Performed by: REGISTERED NURSE

## 2022-09-23 PROCEDURE — 11000001 HC ACUTE MED/SURG PRIVATE ROOM

## 2022-09-23 PROCEDURE — 63600175 PHARM REV CODE 636 W HCPCS: Performed by: REGISTERED NURSE

## 2022-09-23 PROCEDURE — 85025 COMPLETE CBC W/AUTO DIFF WBC: CPT | Performed by: REGISTERED NURSE

## 2022-09-23 PROCEDURE — 87428 SARSCOV & INF VIR A&B AG IA: CPT | Performed by: REGISTERED NURSE

## 2022-09-23 PROCEDURE — 25000003 PHARM REV CODE 250: Performed by: REGISTERED NURSE

## 2022-09-23 PROCEDURE — 93010 ELECTROCARDIOGRAM REPORT: CPT | Mod: ,,, | Performed by: INTERNAL MEDICINE

## 2022-09-23 PROCEDURE — 87040 BLOOD CULTURE FOR BACTERIA: CPT | Performed by: REGISTERED NURSE

## 2022-09-23 PROCEDURE — 99285 EMERGENCY DEPT VISIT HI MDM: CPT | Mod: 25,CS

## 2022-09-23 PROCEDURE — 87081 CULTURE SCREEN ONLY: CPT | Performed by: REGISTERED NURSE

## 2022-09-23 PROCEDURE — 80053 COMPREHEN METABOLIC PANEL: CPT | Performed by: REGISTERED NURSE

## 2022-09-23 PROCEDURE — 96375 TX/PRO/DX INJ NEW DRUG ADDON: CPT

## 2022-09-23 RX ORDER — ONDANSETRON 2 MG/ML
4 INJECTION INTRAMUSCULAR; INTRAVENOUS
Status: COMPLETED | OUTPATIENT
Start: 2022-09-23 | End: 2022-09-23

## 2022-09-23 RX ORDER — TALC
6 POWDER (GRAM) TOPICAL NIGHTLY PRN
Status: DISCONTINUED | OUTPATIENT
Start: 2022-09-24 | End: 2022-09-27 | Stop reason: HOSPADM

## 2022-09-23 RX ORDER — ONDANSETRON 2 MG/ML
4 INJECTION INTRAMUSCULAR; INTRAVENOUS EVERY 8 HOURS PRN
Status: DISCONTINUED | OUTPATIENT
Start: 2022-09-24 | End: 2022-09-27 | Stop reason: HOSPADM

## 2022-09-23 RX ORDER — SODIUM CHLORIDE 0.9 % (FLUSH) 0.9 %
10 SYRINGE (ML) INJECTION
Status: DISCONTINUED | OUTPATIENT
Start: 2022-09-24 | End: 2022-09-27 | Stop reason: HOSPADM

## 2022-09-23 RX ORDER — ACETAMINOPHEN 120 MG/1
325 SUPPOSITORY RECTAL
Status: COMPLETED | OUTPATIENT
Start: 2022-09-23 | End: 2022-09-23

## 2022-09-23 RX ORDER — ACETAMINOPHEN 650 MG/1
650 SUPPOSITORY RECTAL
Status: COMPLETED | OUTPATIENT
Start: 2022-09-23 | End: 2022-09-23

## 2022-09-23 RX ADMIN — ACETAMINOPHEN 650 MG: 650 SUPPOSITORY RECTAL at 09:09

## 2022-09-23 RX ADMIN — AZITHROMYCIN MONOHYDRATE 500 MG: 500 INJECTION, POWDER, LYOPHILIZED, FOR SOLUTION INTRAVENOUS at 11:09

## 2022-09-23 RX ADMIN — CEFTRIAXONE 1 G: 1 INJECTION, POWDER, FOR SOLUTION INTRAMUSCULAR; INTRAVENOUS at 10:09

## 2022-09-23 RX ADMIN — ACETAMINOPHEN 300 MG: 650 SUPPOSITORY RECTAL at 09:09

## 2022-09-23 RX ADMIN — ONDANSETRON 4 MG: 2 INJECTION INTRAMUSCULAR; INTRAVENOUS at 10:09

## 2022-09-23 RX ADMIN — SODIUM CHLORIDE 1000 ML: 9 INJECTION, SOLUTION INTRAVENOUS at 10:09

## 2022-09-23 NOTE — Clinical Note
Diagnosis: Weakness [088339]   Admitting Provider:: LLUVIA ALEX [[id number]]   Future Attending Provider: LLUVIA ALEX [[id number]]   Reason for IP Medical Treatment  (Clinical interventions that can only be accomplished in the IP setting? ) :: IVF, IV antibiotics, lab monitoring   Estimated Length of Stay:: 2 midnights   I certify that Inpatient services for greater than or equal to 2 midnights are medically necessary:: Yes   Plans for Post-Acute care--if anticipated (pick the single best option):: C. Discharge home with home health services   Special Needs:: Fall Risk [15]

## 2022-09-24 PROBLEM — E86.0 DEHYDRATION: Status: ACTIVE | Noted: 2022-09-24

## 2022-09-24 PROBLEM — U07.1 COVID: Status: ACTIVE | Noted: 2022-09-24

## 2022-09-24 PROBLEM — J18.9 PNEUMONIA DUE TO INFECTIOUS ORGANISM: Status: ACTIVE | Noted: 2022-09-24

## 2022-09-24 PROBLEM — R53.1 WEAKNESS: Status: ACTIVE | Noted: 2022-09-24

## 2022-09-24 LAB
ANION GAP SERPL CALCULATED.3IONS-SCNC: 8 MMOL/L (ref 7–16)
BASOPHILS # BLD AUTO: 0.02 K/UL (ref 0–0.2)
BASOPHILS NFR BLD AUTO: 0.2 % (ref 0–1)
BUN SERPL-MCNC: 20 MG/DL (ref 7–18)
BUN/CREAT SERPL: 13 (ref 6–20)
CALCIUM SERPL-MCNC: 8.1 MG/DL (ref 8.5–10.1)
CHLORIDE SERPL-SCNC: 106 MMOL/L (ref 98–107)
CO2 SERPL-SCNC: 28 MMOL/L (ref 21–32)
CREAT SERPL-MCNC: 1.55 MG/DL (ref 0.55–1.02)
DIFFERENTIAL METHOD BLD: ABNORMAL
EGFR (NO RACE VARIABLE) (RUSH/TITUS): 30 ML/MIN/1.73M²
EOSINOPHIL # BLD AUTO: 0 K/UL (ref 0–0.5)
EOSINOPHIL NFR BLD AUTO: 0 % (ref 1–4)
ERYTHROCYTE [DISTWIDTH] IN BLOOD BY AUTOMATED COUNT: 13.5 % (ref 11.5–14.5)
GLUCOSE SERPL-MCNC: 113 MG/DL (ref 70–105)
GLUCOSE SERPL-MCNC: 113 MG/DL (ref 74–106)
GLUCOSE SERPL-MCNC: 85 MG/DL (ref 70–105)
GLUCOSE SERPL-MCNC: 86 MG/DL (ref 70–105)
HCT VFR BLD AUTO: 38 % (ref 38–47)
HGB BLD-MCNC: 12.5 G/DL (ref 12–16)
LYMPHOCYTES # BLD AUTO: 2.09 K/UL (ref 1–4.8)
LYMPHOCYTES NFR BLD AUTO: 16 % (ref 27–41)
LYMPHOCYTES NFR BLD MANUAL: 18 % (ref 27–41)
MCH RBC QN AUTO: 32.1 PG (ref 27–31)
MCHC RBC AUTO-ENTMCNC: 32.9 G/DL (ref 32–36)
MCV RBC AUTO: 97.4 FL (ref 80–96)
MONOCYTES # BLD AUTO: 1.02 K/UL (ref 0–0.8)
MONOCYTES NFR BLD AUTO: 7.8 % (ref 2–6)
MONOCYTES NFR BLD MANUAL: 6 % (ref 2–6)
MPC BLD CALC-MCNC: 10.9 FL (ref 9.4–12.4)
NEUTROPHILS # BLD AUTO: 9.92 K/UL (ref 1.8–7.7)
NEUTROPHILS NFR BLD AUTO: 76 % (ref 53–65)
NEUTS BAND NFR BLD MANUAL: 7 % (ref 1–5)
NEUTS SEG NFR BLD MANUAL: 69 % (ref 50–62)
NRBC BLD MANUAL-RTO: ABNORMAL %
PLATELET # BLD AUTO: 137 K/UL (ref 150–400)
PLATELET MORPHOLOGY: ABNORMAL
POTASSIUM SERPL-SCNC: 3.8 MMOL/L (ref 3.5–5.1)
RBC # BLD AUTO: 3.9 M/UL (ref 4.2–5.4)
RBC MORPH BLD: NORMAL
SODIUM SERPL-SCNC: 138 MMOL/L (ref 136–145)
WBC # BLD AUTO: 13.05 K/UL (ref 4.5–11)

## 2022-09-24 PROCEDURE — 85025 COMPLETE CBC W/AUTO DIFF WBC: CPT | Performed by: NURSE PRACTITIONER

## 2022-09-24 PROCEDURE — 99222 PR INITIAL HOSPITAL CARE,LEVL II: ICD-10-PCS | Mod: ,,, | Performed by: REGISTERED NURSE

## 2022-09-24 PROCEDURE — C9113 INJ PANTOPRAZOLE SODIUM, VIA: HCPCS | Performed by: REGISTERED NURSE

## 2022-09-24 PROCEDURE — 63600175 PHARM REV CODE 636 W HCPCS: Performed by: REGISTERED NURSE

## 2022-09-24 PROCEDURE — 82962 GLUCOSE BLOOD TEST: CPT

## 2022-09-24 PROCEDURE — 99222 1ST HOSP IP/OBS MODERATE 55: CPT | Mod: ,,, | Performed by: REGISTERED NURSE

## 2022-09-24 PROCEDURE — 11000001 HC ACUTE MED/SURG PRIVATE ROOM

## 2022-09-24 PROCEDURE — 25000003 PHARM REV CODE 250: Performed by: REGISTERED NURSE

## 2022-09-24 PROCEDURE — 27000221 HC OXYGEN, UP TO 24 HOURS

## 2022-09-24 PROCEDURE — 94761 N-INVAS EAR/PLS OXIMETRY MLT: CPT

## 2022-09-24 PROCEDURE — 80048 BASIC METABOLIC PNL TOTAL CA: CPT | Performed by: NURSE PRACTITIONER

## 2022-09-24 RX ORDER — GLUCAGON 1 MG
1 KIT INJECTION
Status: DISCONTINUED | OUTPATIENT
Start: 2022-09-24 | End: 2022-09-27 | Stop reason: HOSPADM

## 2022-09-24 RX ORDER — IBUPROFEN 200 MG
16 TABLET ORAL
Status: DISCONTINUED | OUTPATIENT
Start: 2022-09-24 | End: 2022-09-27 | Stop reason: HOSPADM

## 2022-09-24 RX ORDER — PANTOPRAZOLE SODIUM 40 MG/10ML
40 INJECTION, POWDER, LYOPHILIZED, FOR SOLUTION INTRAVENOUS DAILY
Status: DISCONTINUED | OUTPATIENT
Start: 2022-09-24 | End: 2022-09-27 | Stop reason: HOSPADM

## 2022-09-24 RX ORDER — IBUPROFEN 200 MG
24 TABLET ORAL
Status: DISCONTINUED | OUTPATIENT
Start: 2022-09-24 | End: 2022-09-27 | Stop reason: HOSPADM

## 2022-09-24 RX ORDER — SODIUM CHLORIDE 9 MG/ML
INJECTION, SOLUTION INTRAVENOUS CONTINUOUS
Status: DISCONTINUED | OUTPATIENT
Start: 2022-09-24 | End: 2022-09-27 | Stop reason: HOSPADM

## 2022-09-24 RX ADMIN — DILTIAZEM HYDROCHLORIDE 300 MG: 180 CAPSULE, COATED, EXTENDED RELEASE ORAL at 09:09

## 2022-09-24 RX ADMIN — SODIUM CHLORIDE: 9 INJECTION, SOLUTION INTRAVENOUS at 05:09

## 2022-09-24 RX ADMIN — CEFTRIAXONE 1 G: 1 INJECTION, POWDER, FOR SOLUTION INTRAMUSCULAR; INTRAVENOUS at 05:09

## 2022-09-24 RX ADMIN — SODIUM CHLORIDE: 9 INJECTION, SOLUTION INTRAVENOUS at 06:09

## 2022-09-24 RX ADMIN — PANTOPRAZOLE SODIUM 40 MG: 40 INJECTION, POWDER, FOR SOLUTION INTRAVENOUS at 09:09

## 2022-09-24 RX ADMIN — THERA TABS 1 TABLET: TAB at 09:09

## 2022-09-24 NOTE — SUBJECTIVE & OBJECTIVE
Past Medical History:   Diagnosis Date    Alzheimer's disease, unspecified (CODE)     Blind     Diabetes mellitus, type 2     GERD (gastroesophageal reflux disease)     [place] (hard of hearing)     Hypertension     Major depressive disorder, single episode, unspecified        Past Surgical History:   Procedure Laterality Date    TOTAL HIP ARTHROPLASTY         Review of patient's allergies indicates:  No Known Allergies    No current facility-administered medications on file prior to encounter.     Current Outpatient Medications on File Prior to Encounter   Medication Sig    diltiaZEM (CARDIZEM CD) 300 MG 24 hr capsule Take 1 capsule (300 mg total) by mouth once daily.    lisinopriL-hydrochlorothiazide (PRINZIDE,ZESTORETIC) 20-12.5 mg per tablet Take 1 tablet by mouth once daily.    multivit-min-iron-FA-lutein (CENTRUM SILVER WOMEN) 8 mg iron-400 mcg-300 mcg Tab Take 1 tablet by mouth once daily.    pantoprazole (PROTONIX) 40 MG tablet Take 1 tablet (40 mg total) by mouth once daily.     Family History    None       Tobacco Use    Smoking status: Never    Smokeless tobacco: Never   Substance and Sexual Activity    Alcohol use: Never    Drug use: Never    Sexual activity: Not Currently     Review of Systems   Constitutional:  Positive for activity change, appetite change, diaphoresis, fatigue and fever. Negative for chills.   HENT:  Positive for hearing loss (chronic). Negative for congestion, drooling, mouth sores, rhinorrhea, sneezing, sore throat and trouble swallowing.    Eyes: Negative.    Respiratory:  Positive for cough.    Cardiovascular:  Negative for chest pain.   Gastrointestinal:  Positive for vomiting.   Endocrine: Negative.    Genitourinary: Negative.    Musculoskeletal: Negative.    Skin: Negative.    Neurological:  Positive for weakness. Negative for headaches.   Psychiatric/Behavioral: Negative.     Objective:     Vital Signs (Most Recent):  Temp: 98.2 °F (36.8 °C) (09/24/22 0046)  Pulse: 71 (09/24/22  0046)  Resp: 16 (09/24/22 0046)  BP: 120/60 (09/24/22 0046)  SpO2: 96 % (09/24/22 0046) Vital Signs (24h Range):  Temp:  [98.2 °F (36.8 °C)-102.5 °F (39.2 °C)] 98.2 °F (36.8 °C)  Pulse:  [71-83] 71  Resp:  [16-18] 16  SpO2:  [96 %-98 %] 96 %  BP: (120-134)/(52-60) 120/60     Weight: 77.1 kg (170 lb)  Body mass index is 31.09 kg/m².    Physical Exam  Constitutional:       General: She is not in acute distress.     Appearance: She is well-developed. She is not ill-appearing, toxic-appearing or diaphoretic.   HENT:      Head: Normocephalic and atraumatic.      Right Ear: Tympanic membrane and ear canal normal. There is no impacted cerumen.      Left Ear: Tympanic membrane and ear canal normal. There is no impacted cerumen.      Nose: No congestion or rhinorrhea.      Mouth/Throat:      Mouth: Mucous membranes are moist.   Cardiovascular:      Rate and Rhythm: Normal rate and regular rhythm.      Pulses: Normal pulses.      Heart sounds: Normal heart sounds.   Pulmonary:      Effort: Pulmonary effort is normal. No respiratory distress.      Breath sounds: Normal breath sounds. No wheezing.   Abdominal:      General: Bowel sounds are normal.      Palpations: Abdomen is soft.   Musculoskeletal:         General: No swelling.      Right lower leg: No edema.      Left lower leg: No edema.   Lymphadenopathy:      Cervical: No cervical adenopathy.   Skin:     General: Skin is warm and dry.      Capillary Refill: Capillary refill takes less than 2 seconds.   Neurological:      Mental Status: She is lethargic.      Motor: Weakness present.           Significant Labs: All pertinent labs within the past 24 hours have been reviewed.    Significant Imaging: I have reviewed all pertinent imaging results/findings within the past 24 hours.

## 2022-09-24 NOTE — ASSESSMENT & PLAN NOTE
-Rocephin 1 GM IV daily  -Azithromycin 500 mg IV daily  -Monitor CBC daily starting on 9-25-22 due to admit date and time  -Monitor CXR daily starting on 9-25-22 due to admit date and time

## 2022-09-24 NOTE — PLAN OF CARE
Patient without resp distress and sats mid to high 90's on room air. No fever. Receiving IV fluids and did well with liquids at breakfast no vomiting. Does have a dry cough. No falls using bed alarm. No skin breakdown noted.    Problem: Adult Inpatient Plan of Care  Goal: Plan of Care Review  Outcome: Ongoing, Progressing  Goal: Patient-Specific Goal (Individualized)  Outcome: Ongoing, Progressing  Goal: Absence of Hospital-Acquired Illness or Injury  Outcome: Ongoing, Progressing  Goal: Optimal Comfort and Wellbeing  Outcome: Ongoing, Progressing     Problem: Fluid Imbalance (Pneumonia)  Goal: Fluid Balance  Outcome: Ongoing, Progressing     Problem: Infection (Pneumonia)  Goal: Resolution of Infection Signs and Symptoms  Outcome: Ongoing, Progressing     Problem: Respiratory Compromise (Pneumonia)  Goal: Effective Oxygenation and Ventilation  Outcome: Ongoing, Progressing     Problem: Skin Injury Risk Increased  Goal: Skin Health and Integrity  Outcome: Ongoing, Progressing     Problem: Fall Injury Risk  Goal: Absence of Fall and Fall-Related Injury  Outcome: Ongoing, Progressing

## 2022-09-24 NOTE — HPI
Ajay aBss is a 89 yo AAF that  presented to [place] ED POV with c/o weakness, possible LOC, and fever on 9-23-22.  Pt s daughter states that when she was giving her mother a bath, she had an episode of LOC.  Daughter also reports that her mother had vomited each time she tried to take sips of water throughout the day.  Mrs. Bass presented to the ED with an oral temp of 102.5 F.  Daughter states she has not received any antipyretic medication.  She also reports her mother is blind and [place].  Seh reports her mother can normally ambulate using her walker with assistance for short distances without distress.    Her CXR revealed an area in RLL that was concerning for pneumonia.  Lab work revealed abnormalities of Cr 1.92 up from 1.46 on 9-1-22, glucose of 216 mg/dl (normally in 120-130's range per records), and Covid +.  CT head without was unremarkable.     Patients daughter states Mrs. Bass has a history of DM, but she has been taken off of all her medications due to her blood sugar dropping too low.  She also reports her mother has a history of HTN which she takes Zestoretic 20/12.5 1 po daily and Cardizem 300mg 1 po daily.  For her history of GERD, she takes Protonix 40mg 1 po daily.      Mrs. Bass will be admitted for treatment of pneumonia and dehydration.  Also she will be monitored for worsening of Covid.    PMH:  HTN, GERD, DM, blindness, hard of hearing    Full Code    Dr. Pepper Tracy was consulted regarding admit.  Originally, Dr. Tracy ordered Remdesivir for treatment of Covid.  Michele Moyer, Pharmacist at [facility] contacted her regarding the use of Remdesivir and pts renal function.  It was decided Remdesivir would be held and kidney functions monitored for potential use.

## 2022-09-24 NOTE — ED PROVIDER NOTES
"Encounter Date: 9/23/2022       History     Chief Complaint   Patient presents with    Loss of Consciousness    Vomiting     Ajay Bass is a 89 yo AAF that presents with c/o decreased LOC, weakness, vomiting, and fever since this morning.  Daughter states pt is not as alert as normal, and seems more weak than usual.  Daughter reports pt has been trying to drink water today, but has vomited after each attempt.  Daughter states that when she was bathing the pt this morning pt began to sweat and she "went out" but aroused without difficulty.  Pt is [place], blind, and in no acute distress.  Has not received medication for fever prior to arrival at the ED.  Pt denies pain or discomfort when asked by the daughter.    The history is provided by a relative.   Review of patient's allergies indicates:  No Known Allergies  Past Medical History:   Diagnosis Date    Diabetes mellitus, type 2     GERD (gastroesophageal reflux disease)     Hypertension      Past Surgical History:   Procedure Laterality Date    TOTAL HIP ARTHROPLASTY       History reviewed. No pertinent family history.  Social History     Tobacco Use    Smoking status: Never    Smokeless tobacco: Never   Substance Use Topics    Alcohol use: Never    Drug use: Never     Review of Systems   Constitutional:  Positive for activity change, appetite change, diaphoresis, fatigue and fever. Negative for chills.   HENT:  Positive for hearing loss (chronic). Negative for congestion, drooling, rhinorrhea, sneezing and trouble swallowing.    Eyes: Negative.    Respiratory:  Positive for cough. Negative for shortness of breath.    Cardiovascular:  Negative for leg swelling.   Gastrointestinal:  Positive for vomiting. Negative for abdominal distention, constipation and diarrhea.   Endocrine: Negative.    Genitourinary:  Negative for decreased urine volume, difficulty urinating and frequency.   Skin: Negative.    Neurological:  Positive for seizures (Daughter states possibly " this morning while she was bathing her) and weakness. Negative for syncope and headaches.   Psychiatric/Behavioral: Negative.       Physical Exam     Initial Vitals [09/23/22 2103]   BP Pulse Resp Temp SpO2   (!) 130/52 83 16 (!) 102.2 °F (39 °C) 98 %      MAP       --         Physical Exam    Constitutional: She appears well-developed and well-nourished. She appears listless. She is not diaphoretic. No distress.   HENT:   Head: Normocephalic and atraumatic.   Right Ear: External ear normal.   Left Ear: External ear normal.   Nose: Nose normal.   Eyes: Right eye exhibits no discharge. Left eye exhibits no discharge.   Cardiovascular:  Normal rate, regular rhythm, normal heart sounds and intact distal pulses.           Pulmonary/Chest: Breath sounds normal. No respiratory distress. She has no wheezes.   Abdominal: Abdomen is soft. Bowel sounds are normal.   Musculoskeletal:         General: No edema.     Lymphadenopathy:     She has no cervical adenopathy.   Neurological: She appears listless.   Pt is [place] and per daughter she is not at her baseline   Skin: Skin is warm and dry. Capillary refill takes less than 2 seconds.       Medical Screening Exam   See Full Note    ED Course   Procedures  Labs Reviewed   COMPREHENSIVE METABOLIC PANEL - Abnormal; Notable for the following components:       Result Value    Sodium 135 (*)     Glucose 216 (*)     BUN 21 (*)     Creatinine 1.92 (*)     eGFR 23 (*)     All other components within normal limits   SARS-COV2 (COVID) W/ FLU ANTIGEN - Abnormal; Notable for the following components:    COVID-19 Ag Positive (*)     All other components within normal limits    Narrative:     Positive SARS-CoV antigen results indicate the presence of viral antigens; correlation with patient history and presence of clinical signs & symptoms consistent with COVID-19 are necessary to determine infection status.   CBC WITH DIFFERENTIAL - Abnormal; Notable for the following components:    MCH 32.1  (*)     Neutrophils % 89.1 (*)     Lymphocytes % 5.3 (*)     Neutrophils, Abs 8.30 (*)     Lymphocytes, Absolute 0.49 (*)     Eosinophils % 0.0 (*)     All other components within normal limits   MANUAL DIFFERENTIAL - Abnormal; Notable for the following components:    Segmented Neutrophils, Man % 86 (*)     Lymphocytes, Man % 5 (*)     All other components within normal limits   SARS ANTIGEN(PRITI) - Abnormal; Notable for the following components:    COVID-19 Ag Positive (*)     All other components within normal limits    Narrative:     Positive SARS-CoV antigen results indicate the presence of viral antigens; correlation with patient history and presence of clinical signs & symptoms consistent with COVID-19 are necessary to determine infection status.   THROAT SCREEN, RAPID STREP - Normal   CULTURE, BLOOD   CULTURE, BLOOD   CULTURE, STREP A,  THROAT   CBC W/ AUTO DIFFERENTIAL    Narrative:     The following orders were created for panel order CBC auto differential.  Procedure                               Abnormality         Status                     ---------                               -----------         ------                     CBC with Differential[[id number]]        Abnormal            Final result               Manual Differential[[id number]]          Abnormal            Final result                 Please view results for these tests on the individual orders.   URINALYSIS, REFLEX TO URINE CULTURE        ECG Results              EKG 12-lead (In process)  Result time 09/23/22 21:59:04      In process by Interface, Lab In [facility] (09/23/22 21:59:04)                   Narrative:    Test Reason : R53.1,    Vent. Rate : 073 BPM     Atrial Rate : 073 BPM     P-R Int : 188 ms          QRS Dur : 138 ms      QT Int : 456 ms       P-R-T Axes : 055 -83 078 degrees     QTc Int : 502 ms    Normal sinus rhythm  Right bundle branch block  Left anterior fascicular block   Bifascicular block   Abnormal ECG  No previous  ECGs available    Referred By: [email or web address]   SELF           Confirmed By:                                   Imaging Results              X-Ray KUB (In process)                      CT Head Without Contrast (In process)                      X-Ray Chest 1 View (In process)                      Medications   sodium chloride 0.9% bolus 1,000 mL (1,000 mLs Intravenous New Bag 9/23/22 2246)   azithromycin (ZITHROMAX) 500 mg in dextrose 5 % 250 mL IVPB (500 mg Intravenous New Bag 9/23/22 2318)   acetaminophen suppository 650 mg (650 mg Rectal Given 9/23/22 2126)   acetaminophen suppository 300 mg (300 mg Rectal Given 9/23/22 2125)   ondansetron injection 4 mg (4 mg Intravenous Given 9/23/22 2203)   cefTRIAXone (ROCEPHIN) 1 g in dextrose 5 % in water (D5W) 5 % 50 mL IVPB (MB+) (0 g Intravenous Stopped 9/23/22 2317)     Medical Decision Making:   ED Management:  -CXR reveals RLL airspace opacity with trace effusion concerning for pneumonia  -Cr on 9-1-22 was 1.46 today Cr 1.92  -Hx of DM on no medications.  Usually in 120's today 216mg/dl    -2320:  Spoke with Dr. Pepper Tracy Hospitalist at [facility] in [place], [place] who agrees pt needs to be admitted for IVF and IV antibiotics                  Clinical Impression:   Final diagnoses:  [R53.1] Colby Lubin, NP-C  09/23/22 8728

## 2022-09-24 NOTE — ED TRIAGE NOTES
"89 YO FE TO ER WITH DAUGHTER, WHO REPORTS HER MOTHER "PASSED OUT" BRIEFLY WHILE IN THE BATHTUB THIS MORNING, ALSO REPORTS DECREASED APPETITE AND VOMITED X 2.   DAUGHTER REPORTS EMS CAME OUT THIS MORNING AND ASSESSED HER, BUT PT REFUSED TO GO.  HX COMES FROM DAUGHTER  "

## 2022-09-24 NOTE — ASSESSMENT & PLAN NOTE
Patient is identified as Covid positive within 48 hours of testing positive   Initiate standard COVID protocols; COVID-19 testing ,Infection Control notification  and isolation- respiratory, contact and droplet per protocol    Diagnostics: (leukopenia, hyponatremia, hyperferritinemia, elevated troponin, elevated d-dimer, age, and comorbidities are significant predictors of poor clinical outcome)  CBC, CMP, CXR 1 view    Management: Monitor O2 sat, daily CXR 1 view

## 2022-09-24 NOTE — ASSESSMENT & PLAN NOTE
-NS @ 100 ml/hr  -Monitor daily CMP  -Discontinue Zestoretic 20/12.5 1 po daily due to nephrotoxicity until kidney function improves

## 2022-09-25 LAB
ALBUMIN SERPL BCP-MCNC: 2.8 G/DL (ref 3.5–5)
ALBUMIN/GLOB SERPL: 1 {RATIO}
ALP SERPL-CCNC: 76 U/L (ref 55–142)
ALT SERPL W P-5'-P-CCNC: 15 U/L (ref 13–56)
ANION GAP SERPL CALCULATED.3IONS-SCNC: 9 MMOL/L (ref 7–16)
AST SERPL W P-5'-P-CCNC: 23 U/L (ref 15–37)
BASOPHILS # BLD AUTO: 0 K/UL (ref 0–0.2)
BASOPHILS NFR BLD AUTO: 0 % (ref 0–1)
BILIRUB SERPL-MCNC: 0.3 MG/DL (ref ?–1.2)
BUN SERPL-MCNC: 15 MG/DL (ref 7–18)
BUN/CREAT SERPL: 10 (ref 6–20)
CALCIUM SERPL-MCNC: 8.1 MG/DL (ref 8.5–10.1)
CHLORIDE SERPL-SCNC: 106 MMOL/L (ref 98–107)
CO2 SERPL-SCNC: 27 MMOL/L (ref 21–32)
CREAT SERPL-MCNC: 1.48 MG/DL (ref 0.55–1.02)
DIFFERENTIAL METHOD BLD: ABNORMAL
EGFR (NO RACE VARIABLE) (RUSH/TITUS): 31 ML/MIN/1.73M²
EOSINOPHIL # BLD AUTO: 0.02 K/UL (ref 0–0.5)
EOSINOPHIL NFR BLD AUTO: 0.2 % (ref 1–4)
ERYTHROCYTE [DISTWIDTH] IN BLOOD BY AUTOMATED COUNT: 13.6 % (ref 11.5–14.5)
GLOBULIN SER-MCNC: 2.7 G/DL (ref 2–4)
GLUCOSE SERPL-MCNC: 108 MG/DL (ref 74–106)
HCT VFR BLD AUTO: 37.8 % (ref 38–47)
HGB BLD-MCNC: 12.4 G/DL (ref 12–16)
LYMPHOCYTES # BLD AUTO: 1.61 K/UL (ref 1–4.8)
LYMPHOCYTES NFR BLD AUTO: 18.2 % (ref 27–41)
LYMPHOCYTES NFR BLD MANUAL: 17 % (ref 27–41)
MCH RBC QN AUTO: 32 PG (ref 27–31)
MCHC RBC AUTO-ENTMCNC: 32.8 G/DL (ref 32–36)
MCV RBC AUTO: 97.7 FL (ref 80–96)
MONOCYTES # BLD AUTO: 0.7 K/UL (ref 0–0.8)
MONOCYTES NFR BLD AUTO: 7.9 % (ref 2–6)
MONOCYTES NFR BLD MANUAL: 3 % (ref 2–6)
MPC BLD CALC-MCNC: 11 FL (ref 9.4–12.4)
NEUTROPHILS # BLD AUTO: 6.5 K/UL (ref 1.8–7.7)
NEUTROPHILS NFR BLD AUTO: 73.7 % (ref 53–65)
NEUTS BAND NFR BLD MANUAL: 3 % (ref 1–5)
NEUTS SEG NFR BLD MANUAL: 77 % (ref 50–62)
NRBC BLD MANUAL-RTO: ABNORMAL %
PLATELET # BLD AUTO: 141 K/UL (ref 150–400)
POTASSIUM SERPL-SCNC: 3.6 MMOL/L (ref 3.5–5.1)
PROT SERPL-MCNC: 5.5 G/DL (ref 6.4–8.2)
RBC # BLD AUTO: 3.87 M/UL (ref 4.2–5.4)
SODIUM SERPL-SCNC: 138 MMOL/L (ref 136–145)
WBC # BLD AUTO: 8.83 K/UL (ref 4.5–11)

## 2022-09-25 PROCEDURE — 25000003 PHARM REV CODE 250: Performed by: NURSE PRACTITIONER

## 2022-09-25 PROCEDURE — 99222 1ST HOSP IP/OBS MODERATE 55: CPT | Mod: ,,, | Performed by: NURSE PRACTITIONER

## 2022-09-25 PROCEDURE — 80053 COMPREHEN METABOLIC PANEL: CPT | Performed by: REGISTERED NURSE

## 2022-09-25 PROCEDURE — C9113 INJ PANTOPRAZOLE SODIUM, VIA: HCPCS | Performed by: REGISTERED NURSE

## 2022-09-25 PROCEDURE — 85025 COMPLETE CBC W/AUTO DIFF WBC: CPT | Performed by: REGISTERED NURSE

## 2022-09-25 PROCEDURE — 25000003 PHARM REV CODE 250: Performed by: REGISTERED NURSE

## 2022-09-25 PROCEDURE — 94761 N-INVAS EAR/PLS OXIMETRY MLT: CPT

## 2022-09-25 PROCEDURE — 99222 PR INITIAL HOSPITAL CARE,LEVL II: ICD-10-PCS | Mod: ,,, | Performed by: NURSE PRACTITIONER

## 2022-09-25 PROCEDURE — 11000001 HC ACUTE MED/SURG PRIVATE ROOM

## 2022-09-25 PROCEDURE — 27000221 HC OXYGEN, UP TO 24 HOURS

## 2022-09-25 PROCEDURE — 63600175 PHARM REV CODE 636 W HCPCS: Performed by: REGISTERED NURSE

## 2022-09-25 RX ADMIN — THERA TABS 1 TABLET: TAB at 09:09

## 2022-09-25 RX ADMIN — PANTOPRAZOLE SODIUM 40 MG: 40 INJECTION, POWDER, FOR SOLUTION INTRAVENOUS at 09:09

## 2022-09-25 RX ADMIN — CEFTRIAXONE 1 G: 1 INJECTION, POWDER, FOR SOLUTION INTRAMUSCULAR; INTRAVENOUS at 05:09

## 2022-09-25 RX ADMIN — AZITHROMYCIN MONOHYDRATE 500 MG: 500 INJECTION, POWDER, LYOPHILIZED, FOR SOLUTION INTRAVENOUS at 12:09

## 2022-09-25 RX ADMIN — SODIUM CHLORIDE: 9 INJECTION, SOLUTION INTRAVENOUS at 09:09

## 2022-09-25 NOTE — SUBJECTIVE & OBJECTIVE
Interval History: BUN/CR down to 15/1.48, Lungs clear. O2 sat 100% on 1L/NC.    Review of Systems   Constitutional:  Positive for appetite change and fatigue. Negative for activity change, chills, diaphoresis and fever.   HENT:  Positive for hearing loss (chronic). Negative for congestion, drooling, mouth sores, rhinorrhea, sneezing, sore throat and trouble swallowing.    Eyes:  Positive for visual disturbance (legally blind). Negative for pain and discharge.   Respiratory:  Positive for cough. Negative for shortness of breath.    Cardiovascular: Negative.  Negative for chest pain, palpitations and leg swelling.   Gastrointestinal:  Negative for abdominal pain, diarrhea, nausea and vomiting.   Endocrine: Negative.    Genitourinary: Negative.  Negative for dysuria and hematuria.   Musculoskeletal: Negative.    Skin: Negative.  Negative for rash.   Neurological:  Positive for weakness (generalized). Negative for headaches.   Psychiatric/Behavioral: Negative.     Objective:     Vital Signs (Most Recent):  Temp: 98.6 °F (37 °C) (09/25/22 0800)  Pulse: 64 (09/25/22 0800)  Resp: 14 (09/25/22 0800)  BP: (!) 140/65 (09/25/22 0800)  SpO2: 100 % (09/25/22 0800)   Vital Signs (24h Range):  Temp:  [97.2 °F (36.2 °C)-98.8 °F (37.1 °C)] 98.6 °F (37 °C)  Pulse:  [] 64  Resp:  [13-18] 14  SpO2:  [94 %-100 %] 100 %  BP: (121-140)/(43-65) 140/65     Weight: 77.1 kg (170 lb)  Body mass index is 31.09 kg/m².    Intake/Output Summary (Last 24 hours) at 9/25/2022 1041  Last data filed at 9/25/2022 0502  Gross per 24 hour   Intake 1850 ml   Output --   Net 1850 ml      Physical Exam  Vitals and nursing note reviewed.   Constitutional:       General: She is not in acute distress.     Appearance: She is well-developed. She is not ill-appearing, toxic-appearing or diaphoretic.   HENT:      Head: Normocephalic and atraumatic.      Right Ear: Ear canal normal.      Left Ear: Ear canal normal.      Nose: Nose normal. No congestion or  rhinorrhea.      Mouth/Throat:      Mouth: Mucous membranes are moist.   Cardiovascular:      Rate and Rhythm: Normal rate and regular rhythm.      Pulses: Normal pulses.      Heart sounds: Normal heart sounds.   Pulmonary:      Effort: Pulmonary effort is normal. No respiratory distress.      Breath sounds: Normal breath sounds and air entry. No wheezing.   Abdominal:      General: Bowel sounds are normal.      Palpations: Abdomen is soft.      Tenderness: There is no abdominal tenderness.   Musculoskeletal:         General: No swelling.      Right lower leg: No edema.      Left lower leg: No edema.   Lymphadenopathy:      Cervical: No cervical adenopathy.   Skin:     General: Skin is warm and dry.      Capillary Refill: Capillary refill takes less than 2 seconds.   Neurological:      Mental Status: She is alert.      Motor: Weakness present.      Comments: Generalized weakness   Psychiatric:         Mood and Affect: Mood normal.         Speech: Speech normal.         Behavior: Behavior is cooperative.       Significant Labs: All pertinent labs within the past 24 hours have been reviewed.  BMP:   Recent Labs   Lab 09/25/22  0507   *      K 3.6      CO2 27   BUN 15   CREATININE 1.48*   CALCIUM 8.1*     CBC:   Recent Labs   Lab 09/23/22  2135 09/24/22  1003 09/25/22  0507   WBC 9.31 13.05* 8.83   HGB 15.4 12.5 12.4   HCT 45.6 38.0 37.8*    137* 141*       Significant Imaging: I have reviewed all pertinent imaging results/findings within the past 24 hours.

## 2022-09-25 NOTE — ASSESSMENT & PLAN NOTE
-NS @ 100 ml/hr  -Monitor daily CMP  -Discontinue Zestoretic 20/12.5 1 po daily due to nephrotoxicity until kidney function improves    09/25/2022: BUN/CR down to 15/1.45. /65 HR 64  -Decrease NS to 50 ml/hr  -BMP in am

## 2022-09-25 NOTE — PROGRESS NOTES
Notified by DONNA Ruggiero on the floor that Mrs. Mcmahon O2 sats were in the low 80's.  Order given for O2 @ 2L NC.

## 2022-09-25 NOTE — PROGRESS NOTES
[facility] - [facility] Surgical Unit  Hospital Medicine  Progress Note    Patient Name: Ajay Bass  MRN: [id number]  Patient Class: IP- Inpatient   Admission Date: 9/23/2022  Length of Stay: 2 days  Attending Physician: Esau Rosario DO  Primary Care Provider: BEA Szymanski        Subjective:     Principal Problem:Pneumonia due to infectious organism        HPI:  Ajay Bass is a 89 yo AAF that  presented to [place] ED POV with c/o weakness, possible LOC, and fever on 9-23-22.  Pt s daughter states that when she was giving her mother a bath, she had an episode of LOC.  Daughter also reports that her mother had vomited each time she tried to take sips of water throughout the day.  Mrs. Bass presented to the ED with an oral temp of 102.5 F.  Daughter states she has not received any antipyretic medication.  She also reports her mother is blind and [place].  Seh reports her mother can normally ambulate using her walker with assistance for short distances without distress.    Her CXR revealed an area in RLL that was concerning for pneumonia.  Lab work revealed abnormalities of Cr 1.92 up from 1.46 on 9-1-22, glucose of 216 mg/dl (normally in 120-130's range per records), and Covid +.  CT head without was unremarkable.     Patients daughter states Mrs. Bass has a history of DM, but she has been taken off of all her medications due to her blood sugar dropping too low.  She also reports her mother has a history of HTN which she takes Zestoretic 20/12.5 1 po daily and Cardizem 300mg 1 po daily.  For her history of GERD, she takes Protonix 40mg 1 po daily.      Mrs. Bass will be admitted for treatment of pneumonia and dehydration.  Also she will be monitored for worsening of Covid.    PMH:  HTN, GERD, DM, blindness, hard of hearing    Full Code    Dr. Pepper Tracy was consulted regarding admit.  Originally, Dr. Tracy ordered Remdesivir for treatment of Covid.  Michele Moyer, Pharmacist at  [facility] contacted her regarding the use of Remdesivir and pts renal function.  It was decided Remdesivir would be held and kidney functions monitored for potential use.      Overview/Hospital Course:  09/25/2022: Hospital day #2 for IV Rocephin and Azithromycin for COVID pneumonia. Nursing staff reports that O2 sat dropped into the upper 80s during the night, patient was placed on O2 at 2L/NC and sat improved to 96%. Patient was not started on Remdesivir and Zestoretic was discontinued on admission due to NEFTALY. Patient afebrile, VS stable and O2 sat currently 100% on O2 at 1L/NC. Labs today: wbc 8.83, H&H 12.4/37.8, BUN/CR down to 15/1.48 from 21/1.92 on admission. Patient denies any new complaints.       Interval History: BUN/CR down to 15/1.48, Lungs clear. O2 sat 100% on 1L/NC.    Review of Systems   Constitutional:  Positive for appetite change and fatigue. Negative for activity change, chills, diaphoresis and fever.   HENT:  Positive for hearing loss (chronic). Negative for congestion, drooling, mouth sores, rhinorrhea, sneezing, sore throat and trouble swallowing.    Eyes:  Positive for visual disturbance (legally blind). Negative for pain and discharge.   Respiratory:  Positive for cough. Negative for shortness of breath.    Cardiovascular: Negative.  Negative for chest pain, palpitations and leg swelling.   Gastrointestinal:  Negative for abdominal pain, diarrhea, nausea and vomiting.   Endocrine: Negative.    Genitourinary: Negative.  Negative for dysuria and hematuria.   Musculoskeletal: Negative.    Skin: Negative.  Negative for rash.   Neurological:  Positive for weakness (generalized). Negative for headaches.   Psychiatric/Behavioral: Negative.     Objective:     Vital Signs (Most Recent):  Temp: 98.6 °F (37 °C) (09/25/22 0800)  Pulse: 64 (09/25/22 0800)  Resp: 14 (09/25/22 0800)  BP: (!) 140/65 (09/25/22 0800)  SpO2: 100 % (09/25/22 0800)   Vital Signs (24h Range):  Temp:  [97.2 °F (36.2 °C)-98.8 °F (37.1  °C)] 98.6 °F (37 °C)  Pulse:  [] 64  Resp:  [13-18] 14  SpO2:  [94 %-100 %] 100 %  BP: (121-140)/(43-65) 140/65     Weight: 77.1 kg (170 lb)  Body mass index is 31.09 kg/m².    Intake/Output Summary (Last 24 hours) at 9/25/2022 1041  Last data filed at 9/25/2022 0502  Gross per 24 hour   Intake 1850 ml   Output --   Net 1850 ml      Physical Exam  Vitals and nursing note reviewed.   Constitutional:       General: She is not in acute distress.     Appearance: She is well-developed. She is not ill-appearing, toxic-appearing or diaphoretic.   HENT:      Head: Normocephalic and atraumatic.      Right Ear: Ear canal normal.      Left Ear: Ear canal normal.      Nose: Nose normal. No congestion or rhinorrhea.      Mouth/Throat:      Mouth: Mucous membranes are moist.   Cardiovascular:      Rate and Rhythm: Normal rate and regular rhythm.      Pulses: Normal pulses.      Heart sounds: Normal heart sounds.   Pulmonary:      Effort: Pulmonary effort is normal. No respiratory distress.      Breath sounds: Normal breath sounds and air entry. No wheezing.   Abdominal:      General: Bowel sounds are normal.      Palpations: Abdomen is soft.      Tenderness: There is no abdominal tenderness.   Musculoskeletal:         General: No swelling.      Right lower leg: No edema.      Left lower leg: No edema.   Lymphadenopathy:      Cervical: No cervical adenopathy.   Skin:     General: Skin is warm and dry.      Capillary Refill: Capillary refill takes less than 2 seconds.   Neurological:      Mental Status: She is alert.      Motor: Weakness present.      Comments: Generalized weakness   Psychiatric:         Mood and Affect: Mood normal.         Speech: Speech normal.         Behavior: Behavior is cooperative.       Significant Labs: All pertinent labs within the past 24 hours have been reviewed.  BMP:   Recent Labs   Lab 09/25/22  0507   *      K 3.6      CO2 27   BUN 15   CREATININE 1.48*   CALCIUM 8.1*      CBC:   Recent Labs   Lab 09/23/22  2135 09/24/22  1003 09/25/22  0507   WBC 9.31 13.05* 8.83   HGB 15.4 12.5 12.4   HCT 45.6 38.0 37.8*    137* 141*       Significant Imaging: I have reviewed all pertinent imaging results/findings within the past 24 hours.      Assessment/Plan:      * Pneumonia due to infectious organism  -Rocephin 1 GM IV daily  -Azithromycin 500 mg IV daily  -Monitor CBC daily starting on 9-25-22 due to admit date and time  -Monitor CXR daily starting on 9-25-22 09/25/2022: Afebrile. Lungs clear, O2 sat 100% on O2 at 1L/NC. Continue to monitor O2 sats  -Rocephin 1 gm IV daily (day 2)  -Azithromycin 500 mg IV daily (day 2)  -CBC/BMP in am      Weakness  -Fall precautions      Dehydration  -NS @ 100 ml/hr  -Monitor daily CMP  -Discontinue Zestoretic 20/12.5 1 po daily due to nephrotoxicity until kidney function improves    09/25/2022: BUN/CR down to 15/1.45. /65 HR 64  -Decrease NS to 50 ml/hr  -BMP in am      COVID  Patient is identified as Covid positive within 48 hours of testing positive   Initiate standard COVID protocols; COVID-19 testing ,Infection Control notification  and isolation- respiratory, contact and droplet per protocol    Diagnostics: (leukopenia, hyponatremia, hyperferritinemia, elevated troponin, elevated d-dimer, age, and comorbidities are significant predictors of poor clinical outcome)  CBC, CMP, CXR 1 view    Management: Monitor O2 sat, daily CXR 1 view    09/25/2022: Afebrile. VS stable. O2 sat 100% on O2 at 1L/NC. Continues monitor of O2 sat.       VTE Risk Mitigation (From admission, onward)         Ordered     IP VTE HIGH RISK PATIENT  Once         09/23/22 2347     Place sequential compression device  Until discontinued         09/23/22 2347                Discharge Planning   XIMENA:      Code Status: Full Code   Is the patient medically ready for discharge?:     Reason for patient still in hospital (select all that apply): Laboratory test and  Treatment           Discussed patient's case, labs and medications with Dr. Rosario. He agreed with the above POC.      Jo Felix, BEA  Department of Hospital Medicine   [facility]

## 2022-09-25 NOTE — NURSING
0500-Multiple attempts to restart Iv  unsuccessful.NP her aware of difficulty starting IV.Will continue to obtain a working line.Tolerating well.02 continues at 2l denies any pain or discomfort.

## 2022-09-25 NOTE — ASSESSMENT & PLAN NOTE
Patient is identified as Covid positive within 48 hours of testing positive   Initiate standard COVID protocols; COVID-19 testing ,Infection Control notification  and isolation- respiratory, contact and droplet per protocol    Diagnostics: (leukopenia, hyponatremia, hyperferritinemia, elevated troponin, elevated d-dimer, age, and comorbidities are significant predictors of poor clinical outcome)  CBC, CMP, CXR 1 view    Management: Monitor O2 sat, daily CXR 1 view    09/25/2022: Afebrile. VS stable. O2 sat 100% on O2 at 1L/NC. Continues monitor of O2 sat.

## 2022-09-25 NOTE — HOSPITAL COURSE
09/25/2022: Hospital day #2 for IV Rocephin and Azithromycin for COVID pneumonia. Nursing staff reports that O2 sat dropped into the upper 80s during the night, patient was placed on O2 at 2L/NC and sat improved to 96%. Patient was not started on Remdesivir and Zestoretic was discontinued on admission due to NEFTALY. Patient afebrile, VS stable and O2 sat currently 100% on O2 at 1L/NC. Labs today: wbc 8.83, H&H 12.4/37.8, BUN/CR down to 15/1.48 from 21/1.92 on admission. Patient denies any new complaints.       9/26/2022: Pt combative overnight. Pt removed her IV and pulled off her oxygen. Pt's daughter called to help assist with patient. Pt calmed upon her daughter's arrival and her IV was replaved. Will continue azithromycin and rocephin.     9/27/2022: Pt will be discharged to swing bed today. Continues to improve from covid. Continues to be more calm with daughter in the room. Will continue rocephin and azithroymycin.

## 2022-09-25 NOTE — NURSING
2220-Resting quietly in bed. IV N/S infusing to RT forearm at 100ml/hr.Sa02 decreased to 80-82%.Bhavna Bates notified.order rec'd for 02 at 2l via nasal cannula.Resp notified and started on 02 at 2L via N/C head of bed elevated. Sats increasing from low 80s up to 90s

## 2022-09-25 NOTE — ASSESSMENT & PLAN NOTE
-Rocephin 1 GM IV daily  -Azithromycin 500 mg IV daily  -Monitor CBC daily starting on 9-25-22 due to admit date and time  -Monitor CXR daily starting on 9-25-22 09/25/2022: Afebrile. Lungs clear, O2 sat 100% on O2 at 1L/NC. Continue to monitor O2 sats  -Rocephin 1 gm IV daily (day 2)  -Azithromycin 500 mg IV daily (day 2)  -CBC/BMP in am

## 2022-09-26 LAB
ANION GAP SERPL CALCULATED.3IONS-SCNC: 14 MMOL/L (ref 7–16)
ATYPICAL LYMPHOCYTES: ABNORMAL
BASOPHILS # BLD AUTO: 0.01 K/UL (ref 0–0.2)
BASOPHILS NFR BLD AUTO: 0.1 % (ref 0–1)
BUN SERPL-MCNC: 10 MG/DL (ref 7–18)
BUN/CREAT SERPL: 9 (ref 6–20)
CALCIUM SERPL-MCNC: 8.5 MG/DL (ref 8.5–10.1)
CHLORIDE SERPL-SCNC: 104 MMOL/L (ref 98–107)
CO2 SERPL-SCNC: 25 MMOL/L (ref 21–32)
CREAT SERPL-MCNC: 1.14 MG/DL (ref 0.55–1.02)
DIFFERENTIAL METHOD BLD: ABNORMAL
EGFR (NO RACE VARIABLE) (RUSH/TITUS): 43 ML/MIN/1.73M²
EOSINOPHIL # BLD AUTO: 0.01 K/UL (ref 0–0.5)
EOSINOPHIL NFR BLD AUTO: 0.1 % (ref 1–4)
ERYTHROCYTE [DISTWIDTH] IN BLOOD BY AUTOMATED COUNT: 13.2 % (ref 11.5–14.5)
GLUCOSE SERPL-MCNC: 113 MG/DL (ref 74–106)
HCT VFR BLD AUTO: 40.8 % (ref 38–47)
HGB BLD-MCNC: 13.4 G/DL (ref 12–16)
LYMPHOCYTES # BLD AUTO: 2.1 K/UL (ref 1–4.8)
LYMPHOCYTES NFR BLD AUTO: 23.9 % (ref 27–41)
LYMPHOCYTES NFR BLD MANUAL: 17 % (ref 27–41)
MCH RBC QN AUTO: 31.6 PG (ref 27–31)
MCHC RBC AUTO-ENTMCNC: 32.8 G/DL (ref 32–36)
MCV RBC AUTO: 96.2 FL (ref 80–96)
MONOCYTES # BLD AUTO: 0.99 K/UL (ref 0–0.8)
MONOCYTES NFR BLD AUTO: 11.3 % (ref 2–6)
MONOCYTES NFR BLD MANUAL: 12 % (ref 2–6)
MPC BLD CALC-MCNC: 10.8 FL (ref 9.4–12.4)
NEUTROPHILS # BLD AUTO: 5.66 K/UL (ref 1.8–7.7)
NEUTROPHILS NFR BLD AUTO: 64.6 % (ref 53–65)
NEUTS SEG NFR BLD MANUAL: 71 % (ref 50–62)
NRBC BLD MANUAL-RTO: ABNORMAL %
PLATELET # BLD AUTO: 173 K/UL (ref 150–400)
PLATELET MORPHOLOGY: NORMAL
POTASSIUM SERPL-SCNC: 4.3 MMOL/L (ref 3.5–5.1)
RBC # BLD AUTO: 4.24 M/UL (ref 4.2–5.4)
RBC MORPH BLD: NORMAL
SODIUM SERPL-SCNC: 139 MMOL/L (ref 136–145)
WBC # BLD AUTO: 8.77 K/UL (ref 4.5–11)

## 2022-09-26 PROCEDURE — 63600175 PHARM REV CODE 636 W HCPCS: Performed by: REGISTERED NURSE

## 2022-09-26 PROCEDURE — 94761 N-INVAS EAR/PLS OXIMETRY MLT: CPT

## 2022-09-26 PROCEDURE — 27000221 HC OXYGEN, UP TO 24 HOURS

## 2022-09-26 PROCEDURE — 25000003 PHARM REV CODE 250: Performed by: REGISTERED NURSE

## 2022-09-26 PROCEDURE — 85025 COMPLETE CBC W/AUTO DIFF WBC: CPT | Performed by: NURSE PRACTITIONER

## 2022-09-26 PROCEDURE — 11000001 HC ACUTE MED/SURG PRIVATE ROOM

## 2022-09-26 PROCEDURE — C9113 INJ PANTOPRAZOLE SODIUM, VIA: HCPCS | Performed by: REGISTERED NURSE

## 2022-09-26 PROCEDURE — 80048 BASIC METABOLIC PNL TOTAL CA: CPT | Performed by: NURSE PRACTITIONER

## 2022-09-26 PROCEDURE — 36415 COLL VENOUS BLD VENIPUNCTURE: CPT | Performed by: NURSE PRACTITIONER

## 2022-09-26 RX ORDER — ACETAMINOPHEN 325 MG/1
650 TABLET ORAL EVERY 6 HOURS PRN
Status: DISCONTINUED | OUTPATIENT
Start: 2022-09-26 | End: 2022-09-27 | Stop reason: HOSPADM

## 2022-09-26 RX ADMIN — CEFTRIAXONE 1 G: 1 INJECTION, POWDER, FOR SOLUTION INTRAMUSCULAR; INTRAVENOUS at 06:09

## 2022-09-26 RX ADMIN — DILTIAZEM HYDROCHLORIDE 300 MG: 180 CAPSULE, COATED, EXTENDED RELEASE ORAL at 09:09

## 2022-09-26 RX ADMIN — AZITHROMYCIN MONOHYDRATE 500 MG: 500 INJECTION, POWDER, LYOPHILIZED, FOR SOLUTION INTRAVENOUS at 12:09

## 2022-09-26 RX ADMIN — ACETAMINOPHEN 650 MG: 325 TABLET, FILM COATED ORAL at 11:09

## 2022-09-26 RX ADMIN — THERA TABS 1 TABLET: TAB at 09:09

## 2022-09-26 RX ADMIN — AZITHROMYCIN MONOHYDRATE 500 MG: 500 INJECTION, POWDER, LYOPHILIZED, FOR SOLUTION INTRAVENOUS at 11:09

## 2022-09-26 RX ADMIN — PANTOPRAZOLE SODIUM 40 MG: 40 INJECTION, POWDER, FOR SOLUTION INTRAVENOUS at 09:09

## 2022-09-26 NOTE — NURSING
P/c to NP  to follow up on info relayed earlier.Pt still refuses for staff to touch her and antibiotics pastdue.unable to get another IV started at this time.Will allow pt time to refocus.SR up call bell near

## 2022-09-26 NOTE — PLAN OF CARE
[facility]  Initial Discharge Assessment       Primary Care Provider: BEA Szymanski    Admission Diagnosis: Weakness [R53.1]    Admission Date: 9/23/2022  Expected Discharge Date:     Discharge Barriers Identified: None    Payor: MEDICARE / Plan: MEDICARE PART A & B / Product Type: Government /     Extended Emergency Contact Information  Primary Emergency Contact: Rach Estrella  Mobile Phone: [phone]  Relation: Daughter  Preferred language: English   needed? No    Discharge Plan A: Home with family, Home Health  Discharge Plan B: Home with family, Home Health      [facility] [address] [address]  Phone: [phone] Fax: [phone]      Initial Assessment (most recent)       Adult Discharge Assessment - 09/26/22 1147          Discharge Assessment    Assessment Type Discharge Planning Assessment     Source of Information family     Lives With child(rachelle), adult     Do you expect to return to your current living situation? Yes     Do you have help at home or someone to help you manage your care at home? Yes     Who are your caregiver(s) and their phone number(s)? Rach Estrella, daughter, [phone]     Prior to hospitilization cognitive status: Unable to Assess     Current cognitive status: Unable to Assess     Walking or Climbing Stairs Difficulty ambulation difficulty, assistance 1 person     Dressing/Bathing Difficulty bathing difficulty, assistance 1 person;dressing difficulty, assistance 1 person     Dressing/Bathing Management Daughter assist     Home Accessibility wheelchair accessible     Home Layout Able to live on 1st floor     Equipment Currently Used at Home bedside commode;walker, rolling;shower chair     Patient currently being followed by outpatient case management? No     Do you currently have service(s) that help you manage your care at home? No     Do you take prescription  medications? Yes     Do you have prescription coverage? Yes     Do you have any problems affording any of your prescribed medications? No     Is the patient taking medications as prescribed? yes     Who is going to help you get home at discharge? Rach, daughter     How do you get to doctors appointments? family or friend will provide     Are you on dialysis? No     Do you take coumadin? No     Discharge Plan A Home with family;Home Health     Discharge Plan B Home with family;Home Health     DME Needed Upon Discharge  none     Discharge Plan discussed with: Adult children     Discharge Barriers Identified None        Physical Activity    On average, how many days per week do you engage in moderate to strenuous exercise (like a brisk walk)? 0 days     On average, how many minutes do you engage in exercise at this level? 0 min        Financial Resource Strain    How hard is it for you to pay for the very basics like food, housing, medical care, and heating? Somewhat hard        Housing Stability    In the last 12 months, was there a time when you were not able to pay the mortgage or rent on time? No     In the last 12 months, how many places have you lived? 1     In the last 12 months, was there a time when you did not have a steady place to sleep or slept in a shelter (including now)? No        Transportation Needs    In the past 12 months, has lack of transportation kept you from medical appointments or from getting medications? No     In the past 12 months, has lack of transportation kept you from meetings, work, or from getting things needed for daily living? No        Food Insecurity    Within the past 12 months, you worried that your food would run out before you got the money to buy more. Never true     Within the past 12 months, the food you bought just didn't last and you didn't have money to get more. Never true        Stress    Do you feel stress - tense, restless, nervous, or anxious, or unable to sleep  at night because your mind is troubled all the time - these days? Not at all        Social Connections    In a typical week, how many times do you talk on the phone with family, friends, or neighbors? Three times a week     How often do you get together with friends or relatives? Once a week     How often do you attend Evangelical or Christianity services? Never     Do you belong to any clubs or organizations such as Evangelical groups, unions, fraternal or athletic groups, or school groups? Yes     How often do you attend meetings of the clubs or organizations you belong to? Never     Are you , , , , never , or living with a partner?         Alcohol Use    Q1: How often do you have a drink containing alcohol? Never     Q2: How many drinks containing alcohol do you have on a typical day when you are drinking? Patient does not drink     Q3: How often do you have six or more drinks on one occasion? Never        Relationship/Environment    Name(s) of Who Lives With Patient janene Fitch.                 SS spoke with janene Loyd, in room with pt. Pt lives with daughter at home. DME includes rw, shower chair, and bsc. No hh pta. Choice for [facility]. Dc plan is home with family and hh. Ss notified javier with [facility] and clinicals faxed. SS following.

## 2022-09-26 NOTE — NURSING
"Pt combative and confused pulled IV out and pulling off BP cuff,SA02 monitor and cardiac leads.Attempts made to  reassure pt unsuccessful.Nurses Yovani HTOMPSON and Steffi THOMPSON present at bedside.Pt shouting "I just want to be left alone.Pt Blind and severly [place].SR up x2 [place] near.  "

## 2022-09-27 ENCOUNTER — HOSPITAL ENCOUNTER (INPATIENT)
Facility: HOSPITAL | Age: 87
LOS: 10 days | Discharge: HOME HEALTH | DRG: 177 | End: 2022-10-07
Attending: FAMILY MEDICINE | Admitting: FAMILY MEDICINE
Payer: MEDICARE

## 2022-09-27 VITALS
WEIGHT: 170 LBS | OXYGEN SATURATION: 94 % | RESPIRATION RATE: 18 BRPM | BODY MASS INDEX: 31.28 KG/M2 | SYSTOLIC BLOOD PRESSURE: 140 MMHG | TEMPERATURE: 99 F | DIASTOLIC BLOOD PRESSURE: 70 MMHG | HEIGHT: 62 IN | HEART RATE: 65 BPM

## 2022-09-27 DIAGNOSIS — R53.1 WEAKNESS: ICD-10-CM

## 2022-09-27 DIAGNOSIS — J12.82 PNEUMONIA DUE TO COVID-19 VIRUS: ICD-10-CM

## 2022-09-27 DIAGNOSIS — U07.1 PNEUMONIA DUE TO COVID-19 VIRUS: ICD-10-CM

## 2022-09-27 DIAGNOSIS — J18.9 PNEUMONIA DUE TO INFECTIOUS ORGANISM: ICD-10-CM

## 2022-09-27 DIAGNOSIS — J18.9 PNEUMONIA DUE TO INFECTIOUS ORGANISM, UNSPECIFIED LATERALITY, UNSPECIFIED PART OF LUNG: Primary | ICD-10-CM

## 2022-09-27 LAB
ANION GAP SERPL CALCULATED.3IONS-SCNC: 11 MMOL/L (ref 7–16)
BASOPHILS # BLD AUTO: 0.01 K/UL (ref 0–0.2)
BASOPHILS NFR BLD AUTO: 0.2 % (ref 0–1)
BUN SERPL-MCNC: 9 MG/DL (ref 7–18)
BUN/CREAT SERPL: 8 (ref 6–20)
CALCIUM SERPL-MCNC: 8.2 MG/DL (ref 8.5–10.1)
CHLORIDE SERPL-SCNC: 106 MMOL/L (ref 98–107)
CO2 SERPL-SCNC: 26 MMOL/L (ref 21–32)
CREAT SERPL-MCNC: 1.18 MG/DL (ref 0.55–1.02)
DEPRECATED S PYO AG THROAT QL EIA: NORMAL
DIFFERENTIAL METHOD BLD: ABNORMAL
EGFR (NO RACE VARIABLE) (RUSH/TITUS): 41 ML/MIN/1.73M²
EOSINOPHIL # BLD AUTO: 0.04 K/UL (ref 0–0.5)
EOSINOPHIL NFR BLD AUTO: 0.8 % (ref 1–4)
ERYTHROCYTE [DISTWIDTH] IN BLOOD BY AUTOMATED COUNT: 13.5 % (ref 11.5–14.5)
GLUCOSE SERPL-MCNC: 108 MG/DL (ref 74–106)
GLUCOSE SERPL-MCNC: 98 MG/DL (ref 70–105)
HCT VFR BLD AUTO: 37.7 % (ref 38–47)
HGB BLD-MCNC: 12.4 G/DL (ref 12–16)
LYMPHOCYTES # BLD AUTO: 1.33 K/UL (ref 1–4.8)
LYMPHOCYTES NFR BLD AUTO: 26.1 % (ref 27–41)
LYMPHOCYTES NFR BLD MANUAL: 21 % (ref 27–41)
MCH RBC QN AUTO: 31.8 PG (ref 27–31)
MCHC RBC AUTO-ENTMCNC: 32.9 G/DL (ref 32–36)
MCV RBC AUTO: 96.7 FL (ref 80–96)
MONOCYTES # BLD AUTO: 0.69 K/UL (ref 0–0.8)
MONOCYTES NFR BLD AUTO: 13.6 % (ref 2–6)
MONOCYTES NFR BLD MANUAL: 4 % (ref 2–6)
MPC BLD CALC-MCNC: 10.5 FL (ref 9.4–12.4)
NEUTROPHILS # BLD AUTO: 3.02 K/UL (ref 1.8–7.7)
NEUTROPHILS NFR BLD AUTO: 59.3 % (ref 53–65)
NEUTS SEG NFR BLD MANUAL: 75 % (ref 50–62)
NRBC BLD MANUAL-RTO: 1 /100 WBC
PLATELET # BLD AUTO: 170 K/UL (ref 150–400)
POTASSIUM SERPL-SCNC: 3.5 MMOL/L (ref 3.5–5.1)
RBC # BLD AUTO: 3.9 M/UL (ref 4.2–5.4)
SODIUM SERPL-SCNC: 139 MMOL/L (ref 136–145)
WBC # BLD AUTO: 5.09 K/UL (ref 4.5–11)

## 2022-09-27 PROCEDURE — 94761 N-INVAS EAR/PLS OXIMETRY MLT: CPT

## 2022-09-27 PROCEDURE — 11000004 HC SNF PRIVATE

## 2022-09-27 PROCEDURE — 63600175 PHARM REV CODE 636 W HCPCS: Performed by: REGISTERED NURSE

## 2022-09-27 PROCEDURE — 27000221 HC OXYGEN, UP TO 24 HOURS

## 2022-09-27 PROCEDURE — 25000003 PHARM REV CODE 250: Performed by: FAMILY MEDICINE

## 2022-09-27 PROCEDURE — 85025 COMPLETE CBC W/AUTO DIFF WBC: CPT | Performed by: NURSE PRACTITIONER

## 2022-09-27 PROCEDURE — 63600175 PHARM REV CODE 636 W HCPCS: Performed by: FAMILY MEDICINE

## 2022-09-27 PROCEDURE — 36415 COLL VENOUS BLD VENIPUNCTURE: CPT | Performed by: NURSE PRACTITIONER

## 2022-09-27 PROCEDURE — 25000003 PHARM REV CODE 250: Performed by: NURSE PRACTITIONER

## 2022-09-27 PROCEDURE — 25000003 PHARM REV CODE 250: Performed by: REGISTERED NURSE

## 2022-09-27 PROCEDURE — C9113 INJ PANTOPRAZOLE SODIUM, VIA: HCPCS | Performed by: REGISTERED NURSE

## 2022-09-27 PROCEDURE — 82962 GLUCOSE BLOOD TEST: CPT

## 2022-09-27 PROCEDURE — 99239 PR HOSPITAL DISCHARGE DAY,>30 MIN: ICD-10-PCS | Mod: ,,, | Performed by: FAMILY MEDICINE

## 2022-09-27 PROCEDURE — 80048 BASIC METABOLIC PNL TOTAL CA: CPT | Performed by: NURSE PRACTITIONER

## 2022-09-27 PROCEDURE — 99239 HOSP IP/OBS DSCHRG MGMT >30: CPT | Mod: ,,, | Performed by: FAMILY MEDICINE

## 2022-09-27 RX ORDER — ACETAMINOPHEN 325 MG/1
650 TABLET ORAL EVERY 6 HOURS PRN
Status: CANCELLED | OUTPATIENT
Start: 2022-09-27

## 2022-09-27 RX ORDER — TALC
6 POWDER (GRAM) TOPICAL NIGHTLY PRN
Status: DISCONTINUED | OUTPATIENT
Start: 2022-09-27 | End: 2022-10-07 | Stop reason: HOSPADM

## 2022-09-27 RX ORDER — MUPIROCIN 20 MG/G
OINTMENT TOPICAL 2 TIMES DAILY
Status: COMPLETED | OUTPATIENT
Start: 2022-09-27 | End: 2022-10-02

## 2022-09-27 RX ORDER — IBUPROFEN 200 MG
24 TABLET ORAL
Status: DISCONTINUED | OUTPATIENT
Start: 2022-09-27 | End: 2022-10-07 | Stop reason: HOSPADM

## 2022-09-27 RX ORDER — PANTOPRAZOLE SODIUM 40 MG/10ML
40 INJECTION, POWDER, LYOPHILIZED, FOR SOLUTION INTRAVENOUS DAILY
Status: DISCONTINUED | OUTPATIENT
Start: 2022-09-28 | End: 2022-10-01

## 2022-09-27 RX ORDER — PANTOPRAZOLE SODIUM 40 MG/10ML
40 INJECTION, POWDER, LYOPHILIZED, FOR SOLUTION INTRAVENOUS DAILY
Status: CANCELLED | OUTPATIENT
Start: 2022-09-28

## 2022-09-27 RX ORDER — ENOXAPARIN SODIUM 100 MG/ML
30 INJECTION SUBCUTANEOUS EVERY 24 HOURS
Status: DISCONTINUED | OUTPATIENT
Start: 2022-09-27 | End: 2022-09-27 | Stop reason: HOSPADM

## 2022-09-27 RX ORDER — SODIUM CHLORIDE 9 MG/ML
INJECTION, SOLUTION INTRAVENOUS CONTINUOUS
Status: CANCELLED | OUTPATIENT
Start: 2022-09-27

## 2022-09-27 RX ORDER — GLUCAGON 1 MG
1 KIT INJECTION
Status: CANCELLED | OUTPATIENT
Start: 2022-09-27

## 2022-09-27 RX ORDER — ENOXAPARIN SODIUM 100 MG/ML
30 INJECTION SUBCUTANEOUS EVERY 24 HOURS
Status: CANCELLED | OUTPATIENT
Start: 2022-09-27

## 2022-09-27 RX ORDER — ACETAMINOPHEN 325 MG/1
650 TABLET ORAL EVERY 6 HOURS PRN
Status: DISCONTINUED | OUTPATIENT
Start: 2022-09-27 | End: 2022-10-07 | Stop reason: HOSPADM

## 2022-09-27 RX ORDER — IBUPROFEN 200 MG
16 TABLET ORAL
Status: DISCONTINUED | OUTPATIENT
Start: 2022-09-27 | End: 2022-10-07 | Stop reason: HOSPADM

## 2022-09-27 RX ORDER — ONDANSETRON 2 MG/ML
4 INJECTION INTRAMUSCULAR; INTRAVENOUS EVERY 8 HOURS PRN
Status: DISCONTINUED | OUTPATIENT
Start: 2022-09-27 | End: 2022-10-07 | Stop reason: HOSPADM

## 2022-09-27 RX ORDER — TALC
6 POWDER (GRAM) TOPICAL NIGHTLY PRN
Status: CANCELLED | OUTPATIENT
Start: 2022-09-27

## 2022-09-27 RX ORDER — ENOXAPARIN SODIUM 100 MG/ML
30 INJECTION SUBCUTANEOUS EVERY 24 HOURS
Status: DISCONTINUED | OUTPATIENT
Start: 2022-09-27 | End: 2022-10-07 | Stop reason: HOSPADM

## 2022-09-27 RX ORDER — GLUCAGON 1 MG
1 KIT INJECTION
Status: DISCONTINUED | OUTPATIENT
Start: 2022-09-27 | End: 2022-10-07 | Stop reason: HOSPADM

## 2022-09-27 RX ORDER — SODIUM CHLORIDE 0.9 % (FLUSH) 0.9 %
10 SYRINGE (ML) INJECTION
Status: CANCELLED | OUTPATIENT
Start: 2022-09-27

## 2022-09-27 RX ORDER — ADHESIVE BANDAGE
30 BANDAGE TOPICAL DAILY PRN
Status: DISCONTINUED | OUTPATIENT
Start: 2022-09-27 | End: 2022-10-07 | Stop reason: HOSPADM

## 2022-09-27 RX ORDER — ONDANSETRON 2 MG/ML
4 INJECTION INTRAMUSCULAR; INTRAVENOUS EVERY 8 HOURS PRN
Status: CANCELLED | OUTPATIENT
Start: 2022-09-27

## 2022-09-27 RX ORDER — SODIUM CHLORIDE 9 MG/ML
INJECTION, SOLUTION INTRAVENOUS CONTINUOUS
Status: DISCONTINUED | OUTPATIENT
Start: 2022-09-27 | End: 2022-09-28

## 2022-09-27 RX ORDER — IBUPROFEN 200 MG
24 TABLET ORAL
Status: CANCELLED | OUTPATIENT
Start: 2022-09-27

## 2022-09-27 RX ORDER — SODIUM CHLORIDE 0.9 % (FLUSH) 0.9 %
10 SYRINGE (ML) INJECTION
Status: DISCONTINUED | OUTPATIENT
Start: 2022-09-27 | End: 2022-10-07 | Stop reason: HOSPADM

## 2022-09-27 RX ORDER — IBUPROFEN 200 MG
16 TABLET ORAL
Status: CANCELLED | OUTPATIENT
Start: 2022-09-27

## 2022-09-27 RX ADMIN — THERA TABS 1 TABLET: TAB at 09:09

## 2022-09-27 RX ADMIN — SODIUM CHLORIDE: 9 INJECTION, SOLUTION INTRAVENOUS at 03:09

## 2022-09-27 RX ADMIN — ACETAMINOPHEN 650 MG: 325 TABLET, FILM COATED ORAL at 09:09

## 2022-09-27 RX ADMIN — ENOXAPARIN SODIUM 30 MG: 30 INJECTION SUBCUTANEOUS at 05:09

## 2022-09-27 RX ADMIN — CEFTRIAXONE 1 G: 1 INJECTION, POWDER, FOR SOLUTION INTRAMUSCULAR; INTRAVENOUS at 06:09

## 2022-09-27 RX ADMIN — MAGNESIUM HYDROXIDE 2400 MG: 400 SUSPENSION ORAL at 09:09

## 2022-09-27 RX ADMIN — DILTIAZEM HYDROCHLORIDE 300 MG: 180 CAPSULE, COATED, EXTENDED RELEASE ORAL at 09:09

## 2022-09-27 RX ADMIN — MUPIROCIN: 20 OINTMENT TOPICAL at 09:09

## 2022-09-27 RX ADMIN — SODIUM CHLORIDE: 9 INJECTION, SOLUTION INTRAVENOUS at 04:09

## 2022-09-27 RX ADMIN — PANTOPRAZOLE SODIUM 40 MG: 40 INJECTION, POWDER, FOR SOLUTION INTRAVENOUS at 09:09

## 2022-09-27 NOTE — PROGRESS NOTES
[facility] Surgical Unit  Hospital Medicine  Progress Note    Patient Name: Ajay Bass  MRN: [id number]  Patient Class: IP- Inpatient   Admission Date: 9/23/2022  Length of Stay: 3 days  Attending Physician: Esau Rosario DO  Primary Care Provider: BEA Szymanski        Subjective:     Principal Problem:Pneumonia due to infectious organism        HPI:  Ajay Bass is a 89 yo AAF that  presented to [facility] ED POV with c/o weakness, possible LOC, and fever on 9-23-22.  Pt s daughter states that when she was giving her mother a bath, she had an episode of LOC.  Daughter also reports that her mother had vomited each time she tried to take sips of water throughout the day.  Mrs. Bass presented to the ED with an oral temp of 102.5 F.  Daughter states she has not received any antipyretic medication.  She also reports her mother is blind and [place].  Seh reports her mother can normally ambulate using her walker with assistance for short distances without distress.    Her CXR revealed an area in RLL that was concerning for pneumonia.  Lab work revealed abnormalities of Cr 1.92 up from 1.46 on 9-1-22, glucose of 216 mg/dl (normally in 120-130's range per records), and Covid +.  CT head without was unremarkable.     Patients daughter states Mrs. Bass has a history of DM, but she has been taken off of all her medications due to her blood sugar dropping too low.  She also reports her mother has a history of HTN which she takes Zestoretic 20/12.5 1 po daily and Cardizem 300mg 1 po daily.  For her history of GERD, she takes Protonix 40mg 1 po daily.      Mrs. Bass will be admitted for treatment of pneumonia and dehydration.  Also she will be monitored for worsening of Covid.    PMH:  HTN, GERD, DM, blindness, hard of hearing    Full Code    Dr. Pepper Tracy was consulted regarding admit.  Originally, Dr. Tracy ordered Remdesivir for treatment of Covid.  Michele Moyer, Pharmacist at  [facility] contacted her regarding the use of Remdesivir and pts renal function.  It was decided Remdesivir would be held and kidney functions monitored for potential use.      Overview/Hospital Course:  09/25/2022: Hospital day #2 for IV Rocephin and Azithromycin for COVID pneumonia. Nursing staff reports that O2 sat dropped into the upper 80s during the night, patient was placed on O2 at 2L/NC and sat improved to 96%. Patient was not started on Remdesivir and Zestoretic was discontinued on admission due to NEFTALY. Patient afebrile, VS stable and O2 sat currently 100% on O2 at 1L/NC. Labs today: wbc 8.83, H&H 12.4/37.8, BUN/CR down to 15/1.48 from 21/1.92 on admission. Patient denies any new complaints.       9/26/2022: Pt combative overnight. Pt removed her IV and pulled off her oxygen. Pt's daughter called to help assist with patient. Pt calmed upon her daughter's arrival and her IV was replaved. Will continue azithromycin and rocephin.       Interval History:     Review of Systems   Constitutional:  Positive for activity change, appetite change, fatigue and fever.   Respiratory:  Positive for cough and shortness of breath.    Skin:  Positive for color change.   Objective:     Vital Signs (Most Recent):  Temp: 99.2 °F (37.3 °C) (09/26/22 1945)  Pulse: 80 (09/26/22 1945)  Resp: 20 (09/26/22 1945)  BP: (!) 141/75 (09/26/22 1945)  SpO2: 97 % (09/26/22 1951)   Vital Signs (24h Range):  Temp:  [97.8 °F (36.6 °C)-99.2 °F (37.3 °C)] 99.2 °F (37.3 °C)  Pulse:  [75-93] 80  Resp:  [16-20] 20  SpO2:  [93 %-97 %] 97 %  BP: (141-169)/(65-88) 141/75     Weight: 77.1 kg (170 lb)  Body mass index is 31.09 kg/m².    Intake/Output Summary (Last 24 hours) at 9/26/2022 2127  Last data filed at 9/26/2022 1830  Gross per 24 hour   Intake 570 ml   Output --   Net 570 ml      Physical Exam  Constitutional:       Appearance: Normal appearance. She is normal weight.   HENT:      Head: Normocephalic and atraumatic.      Nose: Nose normal.   Eyes:       Extraocular Movements: Extraocular movements intact.      Pupils: Pupils are equal, round, and reactive to light.   Cardiovascular:      Rate and Rhythm: Normal rate and regular rhythm.   Pulmonary:      Effort: Pulmonary effort is normal.      Breath sounds: Normal breath sounds.   Abdominal:      Palpations: Abdomen is soft.   Musculoskeletal:         General: Normal range of motion.      Cervical back: Normal range of motion and neck supple.   Skin:     General: Skin is warm.   Neurological:      Mental Status: She is alert and oriented to person, place, and time.   Psychiatric:         Mood and Affect: Mood normal.       Significant Labs: All pertinent labs within the past 24 hours have been reviewed.  BMP:   Recent Labs   Lab 09/26/22  0523   *      K 4.3      CO2 25   BUN 10   CREATININE 1.14*   CALCIUM 8.5     CBC:   Recent Labs   Lab 09/25/22  0507 09/26/22 0523   WBC 8.83 8.77   HGB 12.4 13.4   HCT 37.8* 40.8   * 173     Recent Lab Results         09/26/22 0523        Anion Gap 14       Atypical Lymphocytes Few       Baso # 0.01       Basophil % 0.1       BUN 10       BUN/CREAT RATIO 9       Calcium 8.5       Chloride 104       CO2 25       Creatinine 1.14       Differential Type Manual       eGFR 43       Eos # 0.01       Eosinophil % 0.1       Glucose 113       Hematocrit 40.8       Hemoglobin 13.4       Lymph # 2.10       Lymph % 23.9        17       MCH 31.6       MCHC 32.8       MCV 96.2       Mono # 0.99       Mono % 11.3        12       MPV 10.8       Neutrophils, Abs 5.66       Neutrophils Relative 64.6       nRBC       PLATELET MORPHOLOGY Normal       Platelets 173       Potassium 4.3       RBC 4.24       RBC Morphology Normal       RDW 13.2       Segmented Neutrophils, Man % 71       Sodium 139       WBC 8.77               Significant Imaging: I have reviewed all pertinent imaging results/findings within the past 24 hours.      Assessment/Plan:      * Pneumonia due  to infectious organism  -Rocephin 1 GM IV daily  -Azithromycin 500 mg IV daily  -Monitor CBC daily starting on 9-25-22 due to admit date and time  -Monitor CXR daily starting on 9-25-22 09/25/2022: Afebrile. Lungs clear, O2 sat 100% on O2 at 1L/NC. Continue to monitor O2 sats  -Rocephin 1 gm IV daily (day 2)  -Azithromycin 500 mg IV daily (day 2)  -CBC/BMP in am      Weakness  -Fall precautions      Dehydration  -NS @ 100 ml/hr  -Monitor daily CMP  -Discontinue Zestoretic 20/12.5 1 po daily due to nephrotoxicity until kidney function improves    09/25/2022: BUN/CR down to 15/1.45. /65 HR 64  -Decrease NS to 50 ml/hr  -BMP in am      COVID  Patient is identified as Covid positive within 48 hours of testing positive   Initiate standard COVID protocols; COVID-19 testing ,Infection Control notification  and isolation- respiratory, contact and droplet per protocol    Diagnostics: (leukopenia, hyponatremia, hyperferritinemia, elevated troponin, elevated d-dimer, age, and comorbidities are significant predictors of poor clinical outcome)  CBC, CMP, CXR 1 view    Management: Monitor O2 sat, daily CXR 1 view    09/25/2022: Afebrile. VS stable. O2 sat 100% on O2 at 1L/NC. Continues monitor of O2 sat.       VTE Risk Mitigation (From admission, onward)         Ordered     IP VTE HIGH RISK PATIENT  Once         09/23/22 2347     Place sequential compression device  Until discontinued         09/23/22 2347                Discharge Planning   XIMENA:      Code Status: Full Code   Is the patient medically ready for discharge?:     Reason for patient still in hospital (select all that apply): Patient trending condition and Imaging  Discharge Plan A: Home with family, Home Health                  Tiarra Bowie MD  Department of Hospital Medicine   [facility]   no

## 2022-09-27 NOTE — SUBJECTIVE & OBJECTIVE
Interval History:     Review of Systems   Constitutional:  Positive for activity change, appetite change, fatigue and fever.   Respiratory:  Positive for cough and shortness of breath.    Skin:  Positive for color change.   Objective:     Vital Signs (Most Recent):  Temp: 99.2 °F (37.3 °C) (09/26/22 1945)  Pulse: 80 (09/26/22 1945)  Resp: 20 (09/26/22 1945)  BP: (!) 141/75 (09/26/22 1945)  SpO2: 97 % (09/26/22 1951)   Vital Signs (24h Range):  Temp:  [97.8 °F (36.6 °C)-99.2 °F (37.3 °C)] 99.2 °F (37.3 °C)  Pulse:  [75-93] 80  Resp:  [16-20] 20  SpO2:  [93 %-97 %] 97 %  BP: (141-169)/(65-88) 141/75     Weight: 77.1 kg (170 lb)  Body mass index is 31.09 kg/m².    Intake/Output Summary (Last 24 hours) at 9/26/2022 2127  Last data filed at 9/26/2022 1830  Gross per 24 hour   Intake 570 ml   Output --   Net 570 ml      Physical Exam  Constitutional:       Appearance: Normal appearance. She is normal weight.   HENT:      Head: Normocephalic and atraumatic.      Nose: Nose normal.   Eyes:      Extraocular Movements: Extraocular movements intact.      Pupils: Pupils are equal, round, and reactive to light.   Cardiovascular:      Rate and Rhythm: Normal rate and regular rhythm.   Pulmonary:      Effort: Pulmonary effort is normal.      Breath sounds: Normal breath sounds.   Abdominal:      Palpations: Abdomen is soft.   Musculoskeletal:         General: Normal range of motion.      Cervical back: Normal range of motion and neck supple.   Skin:     General: Skin is warm.   Neurological:      Mental Status: She is alert and oriented to person, place, and time.   Psychiatric:         Mood and Affect: Mood normal.       Significant Labs: All pertinent labs within the past 24 hours have been reviewed.  BMP:   Recent Labs   Lab 09/26/22  0523   *      K 4.3      CO2 25   BUN 10   CREATININE 1.14*   CALCIUM 8.5     CBC:   Recent Labs   Lab 09/25/22  0507 09/26/22  0523   WBC 8.83 8.77   HGB 12.4 13.4   HCT 37.8*  40.8   * 173     Recent Lab Results         09/26/22  0523        Anion Gap 14       Atypical Lymphocytes Few       Baso # 0.01       Basophil % 0.1       BUN 10       BUN/CREAT RATIO 9       Calcium 8.5       Chloride 104       CO2 25       Creatinine 1.14       Differential Type Manual       eGFR 43       Eos # 0.01       Eosinophil % 0.1       Glucose 113       Hematocrit 40.8       Hemoglobin 13.4       Lymph # 2.10       Lymph % 23.9        17       MCH 31.6       MCHC 32.8       MCV 96.2       Mono # 0.99       Mono % 11.3        12       MPV 10.8       Neutrophils, Abs 5.66       Neutrophils Relative 64.6       nRBC       PLATELET MORPHOLOGY Normal       Platelets 173       Potassium 4.3       RBC 4.24       RBC Morphology Normal       RDW 13.2       Segmented Neutrophils, Man % 71       Sodium 139       WBC 8.77               Significant Imaging: I have reviewed all pertinent imaging results/findings within the past 24 hours.

## 2022-09-27 NOTE — PLAN OF CARE
Patient awake alert, hearing impaired noted, vision impaired noted, speech clear, oriented x-2, respirations even, non labored, no pain or discomfort reported

## 2022-09-27 NOTE — PLAN OF CARE
Problem: Adult Inpatient Plan of Care  Goal: Plan of Care Review  Outcome: Ongoing, Progressing     Problem: Infection (Pneumonia)  Goal: Resolution of Infection Signs and Symptoms  Outcome: Ongoing, Progressing     Problem: Fall Injury Risk  Goal: Absence of Fall and Fall-Related Injury  Outcome: Ongoing, Progressing

## 2022-09-27 NOTE — NURSING
Appears resting, aroused verbally, hearing impaired, respirations even, non labored, makes needs known, no pain or discomfort reported.bed low, family in room.

## 2022-09-27 NOTE — PROGRESS NOTES
Pt's daughter requested softer diet for her mother.  Rec 1. [facility] soft diet with chopped meats and gravy.

## 2022-09-28 LAB — GLUCOSE SERPL-MCNC: 84 MG/DL (ref 70–105)

## 2022-09-28 PROCEDURE — C9113 INJ PANTOPRAZOLE SODIUM, VIA: HCPCS | Performed by: FAMILY MEDICINE

## 2022-09-28 PROCEDURE — 27000221 HC OXYGEN, UP TO 24 HOURS

## 2022-09-28 PROCEDURE — 94761 N-INVAS EAR/PLS OXIMETRY MLT: CPT

## 2022-09-28 PROCEDURE — 63600175 PHARM REV CODE 636 W HCPCS: Performed by: FAMILY MEDICINE

## 2022-09-28 PROCEDURE — 99305 1ST NF CARE MODERATE MDM 35: CPT | Mod: AI,,, | Performed by: FAMILY MEDICINE

## 2022-09-28 PROCEDURE — 82962 GLUCOSE BLOOD TEST: CPT

## 2022-09-28 PROCEDURE — 25000003 PHARM REV CODE 250: Performed by: FAMILY MEDICINE

## 2022-09-28 PROCEDURE — 11000004 HC SNF PRIVATE

## 2022-09-28 PROCEDURE — 99305 PR NURSING FACILITY CARE, INIT, MOD SEVERITY: ICD-10-PCS | Mod: AI,,, | Performed by: FAMILY MEDICINE

## 2022-09-28 RX ORDER — DOCUSATE SODIUM 100 MG/1
100 CAPSULE, LIQUID FILLED ORAL 2 TIMES DAILY
Status: DISCONTINUED | OUTPATIENT
Start: 2022-09-28 | End: 2022-10-07 | Stop reason: HOSPADM

## 2022-09-28 RX ADMIN — DILTIAZEM HYDROCHLORIDE 300 MG: 180 CAPSULE, COATED, EXTENDED RELEASE ORAL at 09:09

## 2022-09-28 RX ADMIN — PANTOPRAZOLE SODIUM 40 MG: 40 INJECTION, POWDER, FOR SOLUTION INTRAVENOUS at 09:09

## 2022-09-28 RX ADMIN — DOCUSATE SODIUM 100 MG: 100 CAPSULE, LIQUID FILLED ORAL at 09:09

## 2022-09-28 RX ADMIN — CEFTRIAXONE 1 G: 1 INJECTION, POWDER, FOR SOLUTION INTRAMUSCULAR; INTRAVENOUS at 05:09

## 2022-09-28 RX ADMIN — MUPIROCIN: 20 OINTMENT TOPICAL at 09:09

## 2022-09-28 RX ADMIN — THERA TABS 1 TABLET: TAB at 09:09

## 2022-09-28 RX ADMIN — AZITHROMYCIN 500 MG: 500 INJECTION, POWDER, LYOPHILIZED, FOR SOLUTION INTRAVENOUS at 09:09

## 2022-09-28 RX ADMIN — AZITHROMYCIN MONOHYDRATE 500 MG: 500 INJECTION, POWDER, LYOPHILIZED, FOR SOLUTION INTRAVENOUS at 12:09

## 2022-09-28 RX ADMIN — ENOXAPARIN SODIUM 30 MG: 30 INJECTION SUBCUTANEOUS at 05:09

## 2022-09-28 NOTE — H&P
[facility]  Hospital Medicine  History & Physical    Patient Name: Ajay Bass  MRN: [id number]  Patient Class: IP- Swing  Admission Date: 9/27/2022  Attending Physician: Tiarra Bowie MD   Primary Care Provider: BEA Szymanski         Patient information was obtained from past medical records and ER records.     Subjective:     Principal Problem:Pneumonia due to infectious organism    Chief Complaint:   Chief Complaint   Patient presents with    Pneumonia     SECONDARY TO COVID        HPI: Admitted to Swing bed from acute stay secondary to covid pneumonia. Prior to admission pt was ambulating at [facility] with use of walker. Pt is blind and hard of hearing. Lives with daughter. Daughter states she has been eating well. Today will be last dose of IV zithromax and will continue rocephin. Will consult PT and OT.       Past Medical History:   Diagnosis Date    Alzheimer's disease, unspecified (CODE)     Blind     Diabetes mellitus, type 2     GERD (gastroesophageal reflux disease)     [place] (hard of hearing)     Hypertension     Major depressive disorder, single episode, unspecified        Past Surgical History:   Procedure Laterality Date    TOTAL HIP ARTHROPLASTY         Review of patient's allergies indicates:  No Known Allergies    Current Facility-Administered Medications on File Prior to Encounter   Medication    [DISCONTINUED] 0.9%  NaCl infusion    [DISCONTINUED] acetaminophen tablet 650 mg    [DISCONTINUED] azithromycin (ZITHROMAX) 500 mg in dextrose 5 % 250 mL IVPB    [DISCONTINUED] cefTRIAXone (ROCEPHIN) 1 g in dextrose 5 % in water (D5W) 5 % 50 mL IVPB (MB+)    [DISCONTINUED] dextrose 50% injection 12.5 g    [DISCONTINUED] dextrose 50% injection 25 g    [DISCONTINUED] diltiaZEM 24 hr capsule 300 mg    [DISCONTINUED] enoxaparin injection 30 mg    [DISCONTINUED] glucagon (human recombinant) injection 1 mg    [DISCONTINUED] glucose chewable tablet 16 g     [DISCONTINUED] glucose chewable tablet 24 g    [DISCONTINUED] melatonin tablet 6 mg    [DISCONTINUED] multivitamin tablet    [DISCONTINUED] ondansetron injection 4 mg    [DISCONTINUED] pantoprazole injection 40 mg    [DISCONTINUED] sodium chloride 0.9% flush 10 mL     Current Outpatient Medications on File Prior to Encounter   Medication Sig    diltiaZEM (CARDIZEM CD) 300 MG 24 hr capsule Take 1 capsule (300 mg total) by mouth once daily.    lisinopriL-hydrochlorothiazide (PRINZIDE,ZESTORETIC) 20-12.5 mg per tablet Take 1 tablet by mouth once daily.    multivit-min-iron-FA-lutein (CENTRUM SILVER WOMEN) 8 mg iron-400 mcg-300 mcg Tab Take 1 tablet by mouth once daily.    pantoprazole (PROTONIX) 40 MG tablet Take 1 tablet (40 mg total) by mouth once daily.     Family History    None       Tobacco Use    Smoking status: Never    Smokeless tobacco: Never   Substance and Sexual Activity    Alcohol use: Never    Drug use: Never    Sexual activity: Not Currently     Review of Systems   Constitutional:  Positive for activity change. Negative for appetite change.   Respiratory:  Positive for cough and shortness of breath.    Musculoskeletal:  Positive for gait problem.   All other systems reviewed and are negative.  Objective:     Vital Signs (Most Recent):  Temp: 98.1 °F (36.7 °C) (09/28/22 0750)  Pulse: 72 (09/28/22 0750)  Resp: 18 (09/28/22 0750)  BP: (!) 156/72 (09/28/22 0750)  SpO2: 97 % (09/28/22 0750)   Vital Signs (24h Range):  Temp:  [98.1 °F (36.7 °C)-100.1 °F (37.8 °C)] 98.1 °F (36.7 °C)  Pulse:  [65-74] 72  Resp:  [17-20] 18  SpO2:  [94 %-97 %] 97 %  BP: (140-156)/(67-72) 156/72     Weight: 77.1 kg (170 lb)  Body mass index is 29.18 kg/m².    Physical Exam  Constitutional:       Appearance: Normal appearance. She is normal weight.   HENT:      Head: Normocephalic and atraumatic.      Nose: Nose normal.      Mouth/Throat:      Mouth: Mucous membranes are moist.   Eyes:      Pupils: Pupils are equal,  round, and reactive to light.   Cardiovascular:      Rate and Rhythm: Normal rate and regular rhythm.   Pulmonary:      Effort: Pulmonary effort is normal.      Breath sounds: Normal breath sounds.   Abdominal:      General: Abdomen is flat.   Musculoskeletal:         General: Normal range of motion.      Cervical back: Normal range of motion and neck supple.   Skin:     General: Skin is warm.   Neurological:      General: No focal deficit present.      Mental Status: She is alert. Mental status is at baseline.         CRANIAL NERVES     CN III, IV, VI   Pupils are equal, round, and reactive to light.     Significant Labs: All pertinent labs within the past 24 hours have been reviewed.  BMP:   Recent Labs   Lab 09/27/22 0433   *      K 3.5      CO2 26   BUN 9   CREATININE 1.18*   CALCIUM 8.2*     CBC:   Recent Labs   Lab 09/27/22 0433   WBC 5.09   HGB 12.4   HCT 37.7*        Urine Culture: No results for input(s): LABURIN in the last 48 hours.  Urine Studies: No results for input(s): COLORU, APPEARANCEUA, PHUR, SPECGRAV, PROTEINUA, GLUCUA, KETONESU, BILIRUBINUA, OCCULTUA, NITRITE, UROBILINOGEN, LEUKOCYTESUR, RBCUA, WBCUA, BACTERIA, SQUAMEPITHEL, HYALINECASTS in the last 48 hours.    Invalid input(s): WRIGHTSUR    Significant Imaging: I have reviewed all pertinent imaging results/findings within the past 24 hours.    Assessment/Plan:     * Pneumonia due to infectious organism  Continue IV abx      Weakness  PT EVALUATE AND TREAT      VTE Risk Mitigation (From admission, onward)         Ordered     enoxaparin injection 30 mg  Daily         09/27/22 1527     IP VTE HIGH RISK PATIENT  Once         09/27/22 1527     Place sequential compression device  Until discontinued         09/27/22 1527                   Tiarra Bowie MD  Department of Hospital Medicine   [facility]

## 2022-09-28 NOTE — PLAN OF CARE
[facility]  Initial Discharge Assessment       Primary Care Provider: BEA Szymanski    Admission Diagnosis: Pneumonia due to COVID-19 virus [U07.1, J12.82]    Admission Date: 9/27/2022  Expected Discharge Date:     Discharge Barriers Identified: None    Payor: MEDICARE / Plan: MEDICARE PART A & B / Product Type: Government /     Extended Emergency Contact Information  Primary Emergency Contact: Rach Estrella  Mobile Phone: [phone]  Relation: Daughter  Preferred language: English   needed? No    Discharge Plan A: Home, Home Health  Discharge Plan B: Home, Home Health      [facility] - [place] - [address] [address]  [address] [place] [id number]  Phone: [phone] Fax: [phone]      Initial Assessment (most recent)       Adult Discharge Assessment - 09/28/22 1018          Discharge Assessment    Assessment Type Discharge Planning Assessment     Source of Information family     Lives With child(rachelle), adult     Do you expect to return to your current living situation? Yes     Do you have help at home or someone to help you manage your care at home? Yes     Who are your caregiver(s) and their phone number(s)? Rach, daughter, [phone]     Prior to hospitilization cognitive status: Unable to Assess     Current cognitive status: Unable to Assess     Walking or Climbing Stairs Difficulty ambulation difficulty, requires equipment     Dressing/Bathing Difficulty bathing difficulty, assistance 1 person     Dressing/Bathing Management Rach assists     Home Accessibility wheelchair accessible     Home Layout Able to live on 1st floor     Equipment Currently Used at Home bedside commode;walker, rolling;shower chair     Patient currently being followed by outpatient case management? No     Do you currently have service(s) that help you manage your care at home? No     Do you take prescription medications? Yes     Do you have prescription coverage?  Yes     Do you have any problems affording any of your prescribed medications? No     Is the patient taking medications as prescribed? yes     Who is going to help you get home at discharge? Rach, daughter     How do you get to doctors appointments? family or friend will provide     Are you on dialysis? No     Do you take coumadin? No     Discharge Plan A Home;Home Health     Discharge Plan B Home;Home Health     DME Needed Upon Discharge  none     Discharge Plan discussed with: Adult children     Discharge Barriers Identified None                 PT admitted to [facility] for PT/OT and IV antibiotics. B explained. Pt lives with daughter at home. DME includes rw, shower chair, and bsc. No hh pta. [facility] for [facility]. Dc plan is home with family and hh. Ss following.

## 2022-09-28 NOTE — SUBJECTIVE & OBJECTIVE
Past Medical History:   Diagnosis Date    Alzheimer's disease, unspecified (CODE)     Blind     Diabetes mellitus, type 2     GERD (gastroesophageal reflux disease)     [place] (hard of hearing)     Hypertension     Major depressive disorder, single episode, unspecified        Past Surgical History:   Procedure Laterality Date    TOTAL HIP ARTHROPLASTY         Review of patient's allergies indicates:  No Known Allergies    Current Facility-Administered Medications on File Prior to Encounter   Medication    [DISCONTINUED] 0.9%  NaCl infusion    [DISCONTINUED] acetaminophen tablet 650 mg    [DISCONTINUED] azithromycin (ZITHROMAX) 500 mg in dextrose 5 % 250 mL IVPB    [DISCONTINUED] cefTRIAXone (ROCEPHIN) 1 g in dextrose 5 % in water (D5W) 5 % 50 mL IVPB (MB+)    [DISCONTINUED] dextrose 50% injection 12.5 g    [DISCONTINUED] dextrose 50% injection 25 g    [DISCONTINUED] diltiaZEM 24 hr capsule 300 mg    [DISCONTINUED] enoxaparin injection 30 mg    [DISCONTINUED] glucagon (human recombinant) injection 1 mg    [DISCONTINUED] glucose chewable tablet 16 g    [DISCONTINUED] glucose chewable tablet 24 g    [DISCONTINUED] melatonin tablet 6 mg    [DISCONTINUED] multivitamin tablet    [DISCONTINUED] ondansetron injection 4 mg    [DISCONTINUED] pantoprazole injection 40 mg    [DISCONTINUED] sodium chloride 0.9% flush 10 mL     Current Outpatient Medications on File Prior to Encounter   Medication Sig    diltiaZEM (CARDIZEM CD) 300 MG 24 hr capsule Take 1 capsule (300 mg total) by mouth once daily.    lisinopriL-hydrochlorothiazide (PRINZIDE,ZESTORETIC) 20-12.5 mg per tablet Take 1 tablet by mouth once daily.    multivit-min-iron-FA-lutein (CENTRUM SILVER WOMEN) 8 mg iron-400 mcg-300 mcg Tab Take 1 tablet by mouth once daily.    pantoprazole (PROTONIX) 40 MG tablet Take 1 tablet (40 mg total) by mouth once daily.     Family History    None       Tobacco Use    Smoking status: Never    Smokeless tobacco: Never   Substance and Sexual  Activity    Alcohol use: Never    Drug use: Never    Sexual activity: Not Currently     Review of Systems   Constitutional:  Positive for activity change. Negative for appetite change.   Respiratory:  Positive for cough and shortness of breath.    Musculoskeletal:  Positive for gait problem.   All other systems reviewed and are negative.  Objective:     Vital Signs (Most Recent):  Temp: 98.1 °F (36.7 °C) (09/28/22 0750)  Pulse: 72 (09/28/22 0750)  Resp: 18 (09/28/22 0750)  BP: (!) 156/72 (09/28/22 0750)  SpO2: 97 % (09/28/22 0750)   Vital Signs (24h Range):  Temp:  [98.1 °F (36.7 °C)-100.1 °F (37.8 °C)] 98.1 °F (36.7 °C)  Pulse:  [65-74] 72  Resp:  [17-20] 18  SpO2:  [94 %-97 %] 97 %  BP: (140-156)/(67-72) 156/72     Weight: 77.1 kg (170 lb)  Body mass index is 29.18 kg/m².    Physical Exam  Constitutional:       Appearance: Normal appearance. She is normal weight.   HENT:      Head: Normocephalic and atraumatic.      Nose: Nose normal.      Mouth/Throat:      Mouth: Mucous membranes are moist.   Eyes:      Pupils: Pupils are equal, round, and reactive to light.   Cardiovascular:      Rate and Rhythm: Normal rate and regular rhythm.   Pulmonary:      Effort: Pulmonary effort is normal.      Breath sounds: Normal breath sounds.   Abdominal:      General: Abdomen is flat.   Musculoskeletal:         General: Normal range of motion.      Cervical back: Normal range of motion and neck supple.   Skin:     General: Skin is warm.   Neurological:      General: No focal deficit present.      Mental Status: She is alert. Mental status is at baseline.         CRANIAL NERVES     CN III, IV, VI   Pupils are equal, round, and reactive to light.     Significant Labs: All pertinent labs within the past 24 hours have been reviewed.  BMP:   Recent Labs   Lab 09/27/22 0433   *      K 3.5      CO2 26   BUN 9   CREATININE 1.18*   CALCIUM 8.2*     CBC:   Recent Labs   Lab 09/27/22 0433   WBC 5.09   HGB 12.4   HCT  37.7*        Urine Culture: No results for input(s): LABURIN in the last 48 hours.  Urine Studies: No results for input(s): COLORU, APPEARANCEUA, PHUR, SPECGRAV, PROTEINUA, GLUCUA, KETONESU, BILIRUBINUA, OCCULTUA, NITRITE, UROBILINOGEN, LEUKOCYTESUR, RBCUA, WBCUA, BACTERIA, SQUAMEPITHEL, HYALINECASTS in the last 48 hours.    Invalid input(s): KRISTY    Significant Imaging: I have reviewed all pertinent imaging results/findings within the past 24 hours.

## 2022-09-28 NOTE — HPI
Admitted to Swing bed from acute stay secondary to covid pneumonia. Prior to admission pt was ambulating at home with use of walker. Pt is blind and hard of hearing. Lives with daughter. Daughter states she has been eating well. Today will be last dose of IV zithromax and will continue rocephin. Will consult PT and OT.

## 2022-09-29 LAB — GLUCOSE SERPL-MCNC: 77 MG/DL (ref 70–105)

## 2022-09-29 PROCEDURE — C9113 INJ PANTOPRAZOLE SODIUM, VIA: HCPCS | Performed by: FAMILY MEDICINE

## 2022-09-29 PROCEDURE — 63600175 PHARM REV CODE 636 W HCPCS: Performed by: FAMILY MEDICINE

## 2022-09-29 PROCEDURE — 82962 GLUCOSE BLOOD TEST: CPT

## 2022-09-29 PROCEDURE — 94761 N-INVAS EAR/PLS OXIMETRY MLT: CPT

## 2022-09-29 PROCEDURE — A4216 STERILE WATER/SALINE, 10 ML: HCPCS | Performed by: FAMILY MEDICINE

## 2022-09-29 PROCEDURE — 27000221 HC OXYGEN, UP TO 24 HOURS

## 2022-09-29 PROCEDURE — 25000003 PHARM REV CODE 250: Performed by: FAMILY MEDICINE

## 2022-09-29 PROCEDURE — 11000004 HC SNF PRIVATE

## 2022-09-29 PROCEDURE — 97162 PT EVAL MOD COMPLEX 30 MIN: CPT

## 2022-09-29 PROCEDURE — 97166 OT EVAL MOD COMPLEX 45 MIN: CPT

## 2022-09-29 PROCEDURE — 97165 OT EVAL LOW COMPLEX 30 MIN: CPT

## 2022-09-29 PROCEDURE — 27200155 *HC SET PRIMARY NONVENTED

## 2022-09-29 RX ADMIN — DOCUSATE SODIUM 100 MG: 100 CAPSULE, LIQUID FILLED ORAL at 09:09

## 2022-09-29 RX ADMIN — DILTIAZEM HYDROCHLORIDE 300 MG: 180 CAPSULE, COATED, EXTENDED RELEASE ORAL at 09:09

## 2022-09-29 RX ADMIN — MUPIROCIN: 20 OINTMENT TOPICAL at 09:09

## 2022-09-29 RX ADMIN — AZITHROMYCIN 500 MG: 500 INJECTION, POWDER, LYOPHILIZED, FOR SOLUTION INTRAVENOUS at 09:09

## 2022-09-29 RX ADMIN — SODIUM CHLORIDE 250 ML: 9 INJECTION, SOLUTION INTRAVENOUS at 05:09

## 2022-09-29 RX ADMIN — PANTOPRAZOLE SODIUM 40 MG: 40 INJECTION, POWDER, FOR SOLUTION INTRAVENOUS at 09:09

## 2022-09-29 RX ADMIN — CEFTRIAXONE 1 G: 1 INJECTION, POWDER, FOR SOLUTION INTRAMUSCULAR; INTRAVENOUS at 05:09

## 2022-09-29 RX ADMIN — THERA TABS 1 TABLET: TAB at 09:09

## 2022-09-29 RX ADMIN — Medication 10 ML: at 05:09

## 2022-09-29 RX ADMIN — ENOXAPARIN SODIUM 30 MG: 30 INJECTION SUBCUTANEOUS at 05:09

## 2022-09-29 NOTE — PT/OT/SLP EVAL
Occupational Therapy   Evaluation    Name: Ajay Bass  MRN: [id number]  Admitting Diagnosis:  Pneumonia due to infectious organism  Recent Surgery: * No surgery found *      Recommendations:     Discharge Recommendations: home  Discharge Equipment Recommendations:  none  Barriers to discharge:       Assessment:     Ajay Bass is a 89 y.o. female with a medical diagnosis of Pneumonia due to infectious organism.  She presents with the following: Performance deficits affecting function: weakness, visual deficits, impaired balance, impaired functional mobility, impaired self care skills, impaired endurance, decreased upper extremity function, decreased safety awareness.      Rehab Prognosis: Fair; patient would benefit from acute skilled OT services to address these deficits and reach maximum level of function.       Plan:     Patient to be seen 5 x/week to address the above listed problems via self-care/home management, therapeutic activities, therapeutic exercises  Plan of Care Expires: 10/27/22  Plan of Care Reviewed with: patient, caregiver, daughter    Subjective     Chief Complaint: weakness and debility  Patient/Family Comments/goals: Goal is to return home with daughter    Occupational Profile:  Living Environment: home  Previous level of function: ambulated in home using RW per daughter  Equipment Used at Home:  walker, rolling, bath bench  Assistance upon Discharge: TBD; CG support    Pain/Comfort:  Pain Rating 1: 0/10    Patients cultural, spiritual, Mu-ism conflicts given the current situation:      Objective:     Communicated with: Nursing staff prior to session.  Patient found left sidelying asleep with oxygen, peripheral IV upon OT entry to room.    General Precautions: Standard, airborne, contact, droplet   Orthopedic Precautions:N/A   Braces: N/A  Respiratory Status: Nasal cannula, flow 2 L/min    Occupational Performance:    Bed Mobility:    Patient completed Rolling/Turning to Right with  minimum assistance and with side rail  Patient completed Supine to Sit with moderate assistance and with side rail  Patient completed Sit to Supine with minimum assistance and with side rail    Functional Mobility/Transfers:  Patient completed Sit <> Stand Transfer with moderate assistance and of 2 persons  with  hand-held assist   Functional Mobility: 15-20 ft with hand-held assist x 2 persons    Activities of Daily Living:  Feeding:  minimum assistance with setup  Grooming: minimum assistance    Bathing: maximal assistance    Upper Body Dressing: maximal assistance    Lower Body Dressing: maximal assistance    Toileting: dependence - diaper currently    Physical Exam:  Upper Extremity Range of Motion:     -       Right Upper Extremity: WFL  -       Left Upper Extremity: WFL  Upper Extremity Strength:    -       Right Upper Extremity: 3+/5  -       Left Upper Extremity: 3+/5   Strength:    -       Right Upper Extremity: 3+/5  -       Left Upper Extremity: 3+/5      Treatment & Education:    Treatment:  Patient completed Rolling/Turning to Right with minimum assistance and with side rail.  Patient completed Supine to Sit with moderate assistance and with side rail.  Patient completed Sit to Supine with minimum assistance and with side rail.  Patient completed Sit <> Stand Transfer with moderate assistance and of 2 persons  with  hand-held assist.  Functional Mobility: 15-20 ft with hand-held assist x 2 persons.    Education:  Pt/CG educated on OT role/POC.   Importance of EOB activity with staff assistance.  Importance of sitting up in the chair as tolerated, especially for meals.  Safety during functional t/f and mobility.  Importance of assisting with self-care activities.    OT met face to face and performed supervision visit with KELSIE Dotson to discuss patient's diagnosis and POC including goals, intervention strategies, POC,and D/C planning for this skilled intervention. Patient will be seen by OT  every 7th visit or every 21 days (which ever comes first). OT will be available for any questions regarding patient progress and response to tx as indicated.      Patient left supine with all lines intact, call button in reach, and daughter present    GOALS:   Multidisciplinary Problems       Occupational Therapy Goals          Problem: Occupational Therapy    Goal Priority Disciplines Outcome Interventions   Occupational Therapy Goal     OT, PT/OT Ongoing, Progressing    Description: STG:  Pt will perform grooming with setup  Pt will bathe with Mod A  Pt will perform UE dressing with Mod A  Pt will perform LE dressing with Mod A  Pt will sit EOB x 10 min with SBA assistance  Pt will transfer bed/chair/bsc with Min A  Pt will perform standing task x 3 min with CGA assistance  Pt will tolerate 5 minutes of tx without fatigue      LT.Restore to max I with self care and mobility.                          History:     Past Medical History:   Diagnosis Date    Alzheimer's disease, unspecified (CODE)     Blind     Diabetes mellitus, type 2     GERD (gastroesophageal reflux disease)     [place] (hard of hearing)     Hypertension     Major depressive disorder, single episode, unspecified          Past Surgical History:   Procedure Laterality Date    TOTAL HIP ARTHROPLASTY         Time Tracking:     OT Date of Treatment: 22  OT Start Time: 1048  OT Stop Time: 1105  OT Total Time (min): 17 min    Billable Minutes:Evaluation Mod complexity    2022

## 2022-09-29 NOTE — PLAN OF CARE
Problem: Occupational Therapy  Goal: Occupational Therapy Goal  Description: STG:  Pt will perform grooming with setup  Pt will bathe with Mod A  Pt will perform UE dressing with Mod A  Pt will perform LE dressing with Mod A  Pt will sit EOB x 10 min with SBA assistance  Pt will transfer bed/chair/bsc with Min A  Pt will perform standing task x 3 min with CGA assistance  Pt will tolerate 5 minutes of tx without fatigue      LT.Restore to max I with self care and mobility.     Outcome: Ongoing, Progressing

## 2022-09-29 NOTE — PLAN OF CARE
Problem: Physical Therapy  Goal: Physical Therapy Goal  Description: Goals to be met by: Discharge     Patient will increase functional independence with mobility by performin. Supine to sit with Stand-by Assistance  2. Sit to stand transfer with Stand-by Assistance  3. Bed to chair transfer with Stand-by Assistance using Rolling Walker  4. Gait  x 75 feet with Contact Guard Assistance using Rolling Walker.     Outcome: Ongoing, Progressing

## 2022-09-29 NOTE — PT/OT/SLP EVAL
Physical Therapy Evaluation    Patient Name:  Ajay Bass   MRN:  [id number]    Recommendations:     Discharge Recommendations:  home, home health PT   Discharge Equipment Recommendations:     Barriers to discharge: None    Assessment:     Ajay Bass is a 89 y.o. female admitted with a medical diagnosis of Pneumonia due to infectious organism.  She presents with the following impairments/functional limitations:  weakness, visual deficits, impaired balance, impaired functional mobility, impaired self care skills, impaired endurance, decreased safety awareness, decreased lower extremity function .    Rehab Prognosis: Good; patient would benefit from acute skilled PT services to address these deficits and reach maximum level of function.    Recent Surgery: * No surgery found *      Plan:     During this hospitalization, patient to be seen   to address the identified rehab impairments via gait training, therapeutic activities, therapeutic exercises, neuromuscular re-education and progress toward the following goals:    Plan of Care Expires:       Subjective     Chief Complaint: Patient without complaints upon therapist arrival and was laying supine in bed with daughter present at time of evaluation. Daughter answered most inquiries for her history.   Patient/Family Comments/goals:   Pain/Comfort:       Patients cultural, spiritual, Taoism conflicts given the current situation: no    Living Environment:  Patient lives with her daughter in a single story home without steps required for entry.   Prior to admission, patients level of function was CGA/Min A with dressing and bathing..  Equipment used at home: walker, rolling, bath bench.  DME owned (not currently used): none.  Upon discharge, patient will have assistance from her daughter.    Objective:     Communicated with nursing staff prior to session.  Patient found supine with    upon PT entry to room.    General Precautions: Standard,     Orthopedic  Precautions:    Braces:    Respiratory Status: Nasal cannula, flow 2 L/min    Exams:  RLE ROM: WFL  RLE Strength: Deficits: 3+/5 gross  LLE ROM: WFL  LLE Strength: Deficits: 3+/5 gross    Functional Mobility:  Bed Mobility:     Rolling Left:  minimum assistance  Rolling Right: minimum assistance  Supine to Sit: minimum assistance  Transfers:     Sit to Stand:  moderate assistance with hand-held assist  Bed to Chair: moderate assistance with  hand-held assist  using  Step Transfer  Gait: 20 feet with Mod A x2        AM-PAC 6 CLICK MOBILITY  Total Score:12     Patient left supine with call button in reach.    GOALS:   Multidisciplinary Problems       Physical Therapy Goals          Problem: Physical Therapy    Goal Priority Disciplines Outcome Goal Variances Interventions   Physical Therapy Goal     PT, PT/OT Ongoing, Progressing     Description: Goals to be met by: Discharge     Patient will increase functional independence with mobility by performin. Supine to sit with Stand-by Assistance  2. Sit to stand transfer with Stand-by Assistance  3. Bed to chair transfer with Stand-by Assistance using Rolling Walker  4. Gait  x 75 feet with Contact Guard Assistance using Rolling Walker.                          History:     Past Medical History:   Diagnosis Date    Alzheimer's disease, unspecified (CODE)     Blind     Diabetes mellitus, type 2     GERD (gastroesophageal reflux disease)     [place] (hard of hearing)     Hypertension     Major depressive disorder, single episode, unspecified        Past Surgical History:   Procedure Laterality Date    TOTAL HIP ARTHROPLASTY         Time Tracking:     PT Received On: 22  PT Start Time: 1048     PT Stop Time: 1105  PT Total Time (min): 17 min     Billable Minutes: Evaluation 17      2022

## 2022-09-30 ENCOUNTER — EXTERNAL CHRONIC CARE MANAGEMENT (OUTPATIENT)
Dept: FAMILY MEDICINE | Facility: CLINIC | Age: 87
End: 2022-09-30
Payer: MEDICARE

## 2022-09-30 LAB
BACTERIA BLD CULT: NORMAL
BACTERIA BLD CULT: NORMAL
GLUCOSE SERPL-MCNC: 75 MG/DL (ref 70–105)

## 2022-09-30 PROCEDURE — G0511 CCM/BHI BY RHC/FQHC 20MIN MO: HCPCS | Mod: ,,, | Performed by: FAMILY MEDICINE

## 2022-09-30 PROCEDURE — 97530 THERAPEUTIC ACTIVITIES: CPT

## 2022-09-30 PROCEDURE — 82962 GLUCOSE BLOOD TEST: CPT

## 2022-09-30 PROCEDURE — 94761 N-INVAS EAR/PLS OXIMETRY MLT: CPT

## 2022-09-30 PROCEDURE — G0511 PR CHRONIC CARE MGMT, RHC OR FQHC ONLY, 20 MINS OR MORE: ICD-10-PCS | Mod: ,,, | Performed by: FAMILY MEDICINE

## 2022-09-30 PROCEDURE — 11000004 HC SNF PRIVATE

## 2022-09-30 PROCEDURE — 97110 THERAPEUTIC EXERCISES: CPT

## 2022-09-30 PROCEDURE — 25000003 PHARM REV CODE 250: Performed by: FAMILY MEDICINE

## 2022-09-30 PROCEDURE — 63600175 PHARM REV CODE 636 W HCPCS: Performed by: FAMILY MEDICINE

## 2022-09-30 PROCEDURE — C9113 INJ PANTOPRAZOLE SODIUM, VIA: HCPCS | Performed by: FAMILY MEDICINE

## 2022-09-30 RX ADMIN — DOCUSATE SODIUM 100 MG: 100 CAPSULE, LIQUID FILLED ORAL at 09:09

## 2022-09-30 RX ADMIN — CEFTRIAXONE 1 G: 1 INJECTION, POWDER, FOR SOLUTION INTRAMUSCULAR; INTRAVENOUS at 05:09

## 2022-09-30 RX ADMIN — SODIUM CHLORIDE 250 ML: 9 INJECTION, SOLUTION INTRAVENOUS at 05:09

## 2022-09-30 RX ADMIN — ENOXAPARIN SODIUM 30 MG: 30 INJECTION SUBCUTANEOUS at 05:09

## 2022-09-30 RX ADMIN — MUPIROCIN: 20 OINTMENT TOPICAL at 08:09

## 2022-09-30 RX ADMIN — DOCUSATE SODIUM 100 MG: 100 CAPSULE, LIQUID FILLED ORAL at 08:09

## 2022-09-30 RX ADMIN — DILTIAZEM HYDROCHLORIDE 300 MG: 180 CAPSULE, COATED, EXTENDED RELEASE ORAL at 09:09

## 2022-09-30 RX ADMIN — PANTOPRAZOLE SODIUM 40 MG: 40 INJECTION, POWDER, FOR SOLUTION INTRAVENOUS at 09:09

## 2022-09-30 RX ADMIN — MUPIROCIN: 20 OINTMENT TOPICAL at 09:09

## 2022-09-30 RX ADMIN — THERA TABS 1 TABLET: TAB at 09:09

## 2022-09-30 NOTE — PLAN OF CARE
Patient's SAT this AM was 93% on room air      Problem: Infection (Pneumonia)  Goal: Resolution of Infection Signs and Symptoms  Outcome: Ongoing, Progressing     Problem: Respiratory Compromise (Pneumonia)  Goal: Effective Oxygenation and Ventilation  Outcome: Ongoing, Progressing

## 2022-09-30 NOTE — PROGRESS NOTES
"  [facility] - Medical Surgical Unit  Adult Nutrition  Consult Note    SUMMARY     Recommendations    Recommendation/Intervention: 1. one carton Ensure Enlive po BID to supplement intake  Goals: Meet EEN >75%  Nutrition Goal Status: new    Assessment and Plan    No new Assessment & Plan notes have been filed under this hospital service since the last note was generated.  Service: Nutrition       Malnutrition Assessment  Malnutrition Type:  (based on assessment this pt is high risk for malnutrition.)                                    Reason for Assessment    Reason For Assessment: consult (swing bed patient)  Diagnosis:  (COVID-19, pneu)  Relevant Medical History: Blind, DM, Alzheimers, GERD, [place], HTN, depression  Interdisciplinary Rounds:  (RDN will attend SB meeting)  General Information Comments: Pt is in isolation and no family is present.  RDN changed diet to MCS with chopped meats per request.  Her daughter or staff feeds her.  Meal intake ~35%. Breakfast is her best meal.    Nutrition Risk Screen    Nutrition Risk Screen:  (adv age, COVID)    Nutrition/Diet History    Patient Reported Diet/Restrictions/Preferences:  (UNK)  Spiritual, Cultural Beliefs, Hindu Practices, Values that Affect Care: no  Food Allergies: NKFA  Factors Affecting Nutritional Intake: decreased appetite, inability to feed self (blind)    Anthropometrics    Temp: 98.6 °F (37 °C)  Height Method: Stated  Height: 5' 4" (162.6 cm)  Height (inches): 64 in  Weight Method: Stated  Weight: 77 kg (169 lb 12.1 oz)  Weight (lb): 169.76 lb  Ideal Body Weight (IBW), Female: 120 lb  % Ideal Body Weight, Female (lb): 141.47 %  BMI (Calculated): 29.1  BMI Grade: 25 - 29.9 - overweight  Weight Loss:  (UNK)       Lab/Procedures/Meds    Pertinent Labs Reviewed: reviewed  Pertinent Labs Comments: B-113, Alb 2.8  Pertinent Medications Reviewed: reviewed  Pertinent Medications Comments: MVM, Protonix    Physical Findings/Assessment     "     Estimated/Assessed Needs    Weight Used For Calorie Calculations: 59.9 kg (132 lb)  Energy Calorie Requirements (kcal): 8492-4141  Energy Need Method: Kcal/kg  Protein Requirements: 60-66  Weight Used For Protein Calculations: 60 kg (132 lb 4.4 oz)     Estimated Fluid Requirement Method: RDA Method  RDA Method (mL): 1496         Nutrition Prescription Ordered    Current Diet Order: [facility] soft wtih chopped meats and gravy    Evaluation of Received Nutrient/Fluid Intake    Energy Calories Required: not meeting needs  Protein Required: not meeting needs  Fluid Required: not meeting needs  Tolerance: tolerating  % Intake of Estimated Energy Needs: 25 - 50 %  % Meal Intake: 25 - 50 %    Nutrition Risk    Level of Risk/Frequency of Follow-up: high       Monitor and Evaluation    Food and Nutrient Intake: food and beverage intake  Anthropometric Measurements: weight  Biochemical Data, Medical Tests and Procedures: electrolyte and renal panel  Nutrition-Focused Physical Findings: overall appearance       Nutrition Follow-Up    RD Follow-up?: Yes

## 2022-09-30 NOTE — PT/OT/SLP PROGRESS
Occupational Therapy   Treatment    Name: Ajay Bass  MRN: [id number]  Admitting Diagnosis:  Pneumonia due to infectious organism       Recommendations:     Discharge Recommendations:    Discharge Equipment Recommendations:  none  Barriers to discharge:       Assessment:     Ajay Bass is a 89 y.o. female with a medical diagnosis of Pneumonia due to infectious organism.  She presents with the following: Performance deficits affecting function are weakness, impaired endurance, impaired self care skills, impaired functional mobility, visual deficits.     Rehab Prognosis:  Fair; patient would benefit from acute skilled OT services to address these deficits and reach maximum level of function.       Plan:     Patient to be seen 5 x/week to address the above listed problems via self-care/home management, therapeutic activities, therapeutic exercises  Plan of Care Expires: 10/27/22  Plan of Care Reviewed with: patient, caregiver, daughter    Subjective     Pain/Comfort:  Pain Rating 1: 0/10    Objective:     Communicated with: Nursing staff prior to session.  Patient found HOB elevated with oxygen, peripheral IV, pulse ox (continuous) upon OT entry to room.    General Precautions: Standard, droplet, airborne, contact   Orthopedic Precautions:N/A   Braces: N/A  Respiratory Status: Nasal cannula     Occupational Performance:     Bed Mobility:    Patient completed Rolling/Turning to Right with moderate assistance  Patient completed Scooting/Bridging with maximal assistance  Patient completed Supine to Sit with moderate assistance  Patient completed Sit to Supine with moderate assistance     Functional Mobility/Transfers:  Patient completed Sit <> Stand Transfer with moderate assistance and of 2 persons  with  hand-held assist   Patient completed Bed <> Chair Transfer using Stand Pivot technique with moderate assistance and of 2 persons with hand-held assist          Treatment & Education:    Therapeutic  Exercises:  Patient completed the following AROM exercises on right and left upper extremities  to work on ROM/strengthening in preparation to complete of bed mobility, ADLs and transfers:     -Elbow flexion/extension: 2 sets of 10   -Shoulder flexion: 2 sets of 10   -Chest press: 2 sets of 10   -Hand squeezes: 2 sets of 10    Therapeutic Activities:  Patient completed Rolling/Turning to Right with moderate assistance  Patient completed Scooting/Bridging with maximal assistance  Patient completed Supine to Sit with moderate assistance  Patient completed Sit to Supine with moderate assistance    Patient completed Sit <> Stand Transfer with moderate assistance with  hand-held assist   Patient completed Bed <> Chair Transfer using Stand Pivot technique with moderate assistance with hand-held assist      Increased time/effort needed to complete each exercise.        Patient left HOB elevated with all lines intact and call button in reach    GOALS:   Multidisciplinary Problems       Occupational Therapy Goals          Problem: Occupational Therapy    Goal Priority Disciplines Outcome Interventions   Occupational Therapy Goal     OT, PT/OT Ongoing, Progressing    Description: STG:  Pt will perform grooming with setup  Pt will bathe with Mod A  Pt will perform UE dressing with Mod A  Pt will perform LE dressing with Mod A  Pt will sit EOB x 10 min with SBA assistance  Pt will transfer bed/chair/bsc with Min A  Pt will perform standing task x 3 min with CGA assistance  Pt will tolerate 5 minutes of tx without fatigue      LT.Restore to max I with self care and mobility.                          Time Tracking:     OT Date of Treatment: 22  OT Start Time: 230  OT Stop Time: 300  OT Total Time (min): 30 min    Billable Minutes:Therapeutic Activity 15  Therapeutic Exercise 15    OT/DUDLEY: OT          2022

## 2022-09-30 NOTE — PT/OT/SLP PROGRESS
Physical Therapy Treatment    Patient Name:  Ajay Bass   MRN:  [id number]    Recommendations:     Discharge Recommendations:  home, home health PT   Discharge Equipment Recommendations:     Barriers to discharge: None    Assessment:     Ajay Bass is a 89 y.o. female admitted with a medical diagnosis of Pneumonia due to infectious organism.  She presents with the following impairments/functional limitations:  weakness, visual deficits, impaired balance, impaired functional mobility, impaired self care skills, impaired endurance, decreased safety awareness, decreased lower extremity function .    Rehab Prognosis: Good; patient would benefit from acute skilled PT services to address these deficits and reach maximum level of function.    Recent Surgery: * No surgery found *      Plan:     During this hospitalization, patient to be seen   to address the identified rehab impairments via gait training, therapeutic activities, therapeutic exercises, neuromuscular re-education and progress toward the following goals:    Plan of Care Expires:       Subjective     Chief Complaint: Patient without complaints and is agreeable to tx today.  Patient/Family Comments/goals:   Pain/Comfort:         Objective:     Communicated with nursing staff prior to session.  Patient found supine with   upon PT entry to room.     General Precautions: Standard,     Orthopedic Precautions:    Braces:    Respiratory Status: Room air     Functional Mobility:  Bed Mobility:     Rolling Left:  stand by assistance  Rolling Right: stand by assistance  Supine to Sit: minimum assistance  Transfers:     Sit to Stand:  minimum assistance with rolling walker  Bed to Chair: moderate assistance with  rolling walker  using  Stand Pivot  Gait: 15 feet with use of handheld assist      AM-PAC 6 CLICK MOBILITY          Therapeutic Activities and Exercises:   Therapist facilitated transfers sit<>stand with HHA for 6 trials. Therapist also facilitated  transfer bed<>chair with mod A with HHA for 2 trials with therapist providing cues for sequencing and safety.    Patient left supine with call button in reach..    GOALS:   Multidisciplinary Problems       Physical Therapy Goals          Problem: Physical Therapy    Goal Priority Disciplines Outcome Goal Variances Interventions   Physical Therapy Goal     PT, PT/OT Ongoing, Progressing     Description: Goals to be met by: Discharge     Patient will increase functional independence with mobility by performin. Supine to sit with Stand-by Assistance  2. Sit to stand transfer with Stand-by Assistance  3. Bed to chair transfer with Stand-by Assistance using Rolling Walker  4. Gait  x 75 feet with Contact Guard Assistance using Rolling Walker.                          Time Tracking:     PT Received On: 22  PT Start Time: 1430     PT Stop Time: 1500  PT Total Time (min): 30 min  2022

## 2022-10-01 LAB
ANION GAP SERPL CALCULATED.3IONS-SCNC: 8 MMOL/L (ref 7–16)
BUN SERPL-MCNC: 14 MG/DL (ref 7–18)
BUN/CREAT SERPL: 13 (ref 6–20)
CALCIUM SERPL-MCNC: 8.7 MG/DL (ref 8.5–10.1)
CHLORIDE SERPL-SCNC: 105 MMOL/L (ref 98–107)
CO2 SERPL-SCNC: 29 MMOL/L (ref 21–32)
CREAT SERPL-MCNC: 1.04 MG/DL (ref 0.55–1.02)
EGFR (NO RACE VARIABLE) (RUSH/TITUS): 48 ML/MIN/1.73M²
GLUCOSE SERPL-MCNC: 103 MG/DL (ref 74–106)
GLUCOSE SERPL-MCNC: 89 MG/DL (ref 70–105)
POTASSIUM SERPL-SCNC: 3.6 MMOL/L (ref 3.5–5.1)
SODIUM SERPL-SCNC: 138 MMOL/L (ref 136–145)

## 2022-10-01 PROCEDURE — C9113 INJ PANTOPRAZOLE SODIUM, VIA: HCPCS | Performed by: FAMILY MEDICINE

## 2022-10-01 PROCEDURE — 82962 GLUCOSE BLOOD TEST: CPT

## 2022-10-01 PROCEDURE — 80048 BASIC METABOLIC PNL TOTAL CA: CPT | Performed by: NURSE PRACTITIONER

## 2022-10-01 PROCEDURE — 27000221 HC OXYGEN, UP TO 24 HOURS

## 2022-10-01 PROCEDURE — 11000004 HC SNF PRIVATE

## 2022-10-01 PROCEDURE — 36415 COLL VENOUS BLD VENIPUNCTURE: CPT | Performed by: NURSE PRACTITIONER

## 2022-10-01 PROCEDURE — 94761 N-INVAS EAR/PLS OXIMETRY MLT: CPT

## 2022-10-01 PROCEDURE — 63600175 PHARM REV CODE 636 W HCPCS: Performed by: FAMILY MEDICINE

## 2022-10-01 PROCEDURE — 25000003 PHARM REV CODE 250: Performed by: FAMILY MEDICINE

## 2022-10-01 RX ORDER — PANTOPRAZOLE SODIUM 40 MG/1
40 TABLET, DELAYED RELEASE ORAL DAILY
Status: DISCONTINUED | OUTPATIENT
Start: 2022-10-02 | End: 2022-10-07 | Stop reason: HOSPADM

## 2022-10-01 RX ADMIN — DILTIAZEM HYDROCHLORIDE 300 MG: 180 CAPSULE, COATED, EXTENDED RELEASE ORAL at 09:10

## 2022-10-01 RX ADMIN — PANTOPRAZOLE SODIUM 40 MG: 40 INJECTION, POWDER, FOR SOLUTION INTRAVENOUS at 09:10

## 2022-10-01 RX ADMIN — DOCUSATE SODIUM 100 MG: 100 CAPSULE, LIQUID FILLED ORAL at 09:10

## 2022-10-01 RX ADMIN — ENOXAPARIN SODIUM 30 MG: 30 INJECTION SUBCUTANEOUS at 05:10

## 2022-10-01 RX ADMIN — MUPIROCIN: 20 OINTMENT TOPICAL at 09:10

## 2022-10-01 RX ADMIN — THERA TABS 1 TABLET: TAB at 09:10

## 2022-10-02 LAB — GLUCOSE SERPL-MCNC: 105 MG/DL (ref 70–105)

## 2022-10-02 PROCEDURE — 25000003 PHARM REV CODE 250: Performed by: FAMILY MEDICINE

## 2022-10-02 PROCEDURE — 27000221 HC OXYGEN, UP TO 24 HOURS

## 2022-10-02 PROCEDURE — 25000003 PHARM REV CODE 250: Performed by: REGISTERED NURSE

## 2022-10-02 PROCEDURE — 82962 GLUCOSE BLOOD TEST: CPT

## 2022-10-02 PROCEDURE — 11000004 HC SNF PRIVATE

## 2022-10-02 PROCEDURE — 63600175 PHARM REV CODE 636 W HCPCS: Performed by: FAMILY MEDICINE

## 2022-10-02 PROCEDURE — 99900035 HC TECH TIME PER 15 MIN (STAT)

## 2022-10-02 PROCEDURE — 94761 N-INVAS EAR/PLS OXIMETRY MLT: CPT

## 2022-10-02 PROCEDURE — 25000003 PHARM REV CODE 250: Performed by: NURSE PRACTITIONER

## 2022-10-02 RX ADMIN — PANTOPRAZOLE SODIUM 40 MG: 40 TABLET, DELAYED RELEASE ORAL at 08:10

## 2022-10-02 RX ADMIN — MAGNESIUM HYDROXIDE 2400 MG: 400 SUSPENSION ORAL at 09:10

## 2022-10-02 RX ADMIN — MUPIROCIN: 20 OINTMENT TOPICAL at 09:10

## 2022-10-02 RX ADMIN — DOCUSATE SODIUM 100 MG: 100 CAPSULE, LIQUID FILLED ORAL at 09:10

## 2022-10-02 RX ADMIN — DILTIAZEM HYDROCHLORIDE 300 MG: 180 CAPSULE, COATED, EXTENDED RELEASE ORAL at 08:10

## 2022-10-02 RX ADMIN — ENOXAPARIN SODIUM 30 MG: 30 INJECTION SUBCUTANEOUS at 04:10

## 2022-10-02 RX ADMIN — THERA TABS 1 TABLET: TAB at 08:10

## 2022-10-02 RX ADMIN — DOCUSATE SODIUM 100 MG: 100 CAPSULE, LIQUID FILLED ORAL at 08:10

## 2022-10-02 NOTE — PLAN OF CARE
Patient's SAT this AM was 95% on room air      Problem: Infection (Pneumonia)  Goal: Resolution of Infection Signs and Symptoms  Outcome: Ongoing, Progressing     Problem: Respiratory Compromise (Pneumonia)  Goal: Effective Oxygenation and Ventilation  Outcome: Ongoing, Progressing

## 2022-10-02 NOTE — PLAN OF CARE
Problem: Infection (Pneumonia)  Goal: Resolution of Infection Signs and Symptoms  Outcome: Ongoing, Progressing     Problem: Fall Injury Risk  Goal: Absence of Fall and Fall-Related Injury  Outcome: Ongoing, Progressing     Problem: Skin Injury Risk Increased  Goal: Skin Health and Integrity  Outcome: Ongoing, Progressing

## 2022-10-03 LAB — GLUCOSE SERPL-MCNC: 108 MG/DL (ref 70–105)

## 2022-10-03 PROCEDURE — 63600175 PHARM REV CODE 636 W HCPCS: Performed by: FAMILY MEDICINE

## 2022-10-03 PROCEDURE — 25000003 PHARM REV CODE 250: Performed by: FAMILY MEDICINE

## 2022-10-03 PROCEDURE — 82962 GLUCOSE BLOOD TEST: CPT

## 2022-10-03 PROCEDURE — 11000004 HC SNF PRIVATE

## 2022-10-03 PROCEDURE — 94761 N-INVAS EAR/PLS OXIMETRY MLT: CPT

## 2022-10-03 PROCEDURE — 25000003 PHARM REV CODE 250: Performed by: NURSE PRACTITIONER

## 2022-10-03 RX ADMIN — MELATONIN TAB 3 MG 6 MG: 3 TAB at 09:10

## 2022-10-03 RX ADMIN — DILTIAZEM HYDROCHLORIDE 300 MG: 180 CAPSULE, COATED, EXTENDED RELEASE ORAL at 09:10

## 2022-10-03 RX ADMIN — DOCUSATE SODIUM 100 MG: 100 CAPSULE, LIQUID FILLED ORAL at 09:10

## 2022-10-03 RX ADMIN — PANTOPRAZOLE SODIUM 40 MG: 40 TABLET, DELAYED RELEASE ORAL at 09:10

## 2022-10-03 RX ADMIN — THERA TABS 1 TABLET: TAB at 09:10

## 2022-10-03 RX ADMIN — ENOXAPARIN SODIUM 30 MG: 30 INJECTION SUBCUTANEOUS at 05:10

## 2022-10-03 NOTE — PT/OT/SLP PROGRESS
Physical Therapy      Patient Name:  Ajay Bass   MRN:  [id number]    Patient not seen today secondary to Patient unwilling to participate. Will follow-up 10/4/2022.

## 2022-10-03 NOTE — PLAN OF CARE
Problem: Infection (Pneumonia)  Goal: Resolution of Infection Signs and Symptoms  10/3/2022 0033 by Mei Medina RN  Outcome: Ongoing, Progressing    Problem: Fall Injury Risk  Goal: Absence of Fall and Fall-Related Injury  10/3/2022 0033 by Mei Medina RN  Outcome: Ongoing, Progressing

## 2022-10-03 NOTE — PT/OT/SLP PROGRESS
Occupational Therapy      Patient Name:  Ajay Bass   MRN:  [id number]    Patient not seen today secondary to Patient unwilling to participate. She stated she was taking a nap and that her daughter told her not to get out of bed.  Will follow-up 10/4/2022.    10/3/2022

## 2022-10-04 PROBLEM — F32.5 MAJOR DEPRESSIVE DISORDER WITH SINGLE EPISODE, IN FULL REMISSION: Status: RESOLVED | Noted: 2021-06-07 | Resolved: 2022-10-04

## 2022-10-04 LAB
ANION GAP SERPL CALCULATED.3IONS-SCNC: 7 MMOL/L (ref 7–16)
BASOPHILS # BLD AUTO: 0.01 K/UL (ref 0–0.2)
BASOPHILS NFR BLD AUTO: 0.2 % (ref 0–1)
BUN SERPL-MCNC: 18 MG/DL (ref 7–18)
BUN/CREAT SERPL: 16 (ref 6–20)
CALCIUM SERPL-MCNC: 9.2 MG/DL (ref 8.5–10.1)
CHLORIDE SERPL-SCNC: 106 MMOL/L (ref 98–107)
CO2 SERPL-SCNC: 30 MMOL/L (ref 21–32)
CREAT SERPL-MCNC: 1.12 MG/DL (ref 0.55–1.02)
DIFFERENTIAL METHOD BLD: ABNORMAL
EGFR (NO RACE VARIABLE) (RUSH/TITUS): 44 ML/MIN/1.73M²
EOSINOPHIL # BLD AUTO: 0.05 K/UL (ref 0–0.5)
EOSINOPHIL NFR BLD AUTO: 0.8 % (ref 1–4)
ERYTHROCYTE [DISTWIDTH] IN BLOOD BY AUTOMATED COUNT: 13.2 % (ref 11.5–14.5)
GLUCOSE SERPL-MCNC: 118 MG/DL (ref 74–106)
GLUCOSE SERPL-MCNC: 99 MG/DL (ref 70–105)
HCT VFR BLD AUTO: 35.9 % (ref 38–47)
HGB BLD-MCNC: 11.6 G/DL (ref 12–16)
LYMPHOCYTES # BLD AUTO: 1.84 K/UL (ref 1–4.8)
LYMPHOCYTES NFR BLD AUTO: 29.9 % (ref 27–41)
LYMPHOCYTES NFR BLD MANUAL: 21 % (ref 27–41)
MCH RBC QN AUTO: 31.4 PG (ref 27–31)
MCHC RBC AUTO-ENTMCNC: 32.3 G/DL (ref 32–36)
MCV RBC AUTO: 97.3 FL (ref 80–96)
MONOCYTES # BLD AUTO: 0.67 K/UL (ref 0–0.8)
MONOCYTES NFR BLD AUTO: 10.9 % (ref 2–6)
MONOCYTES NFR BLD MANUAL: 12 % (ref 2–6)
MPC BLD CALC-MCNC: 10.1 FL (ref 9.4–12.4)
NEUTROPHILS # BLD AUTO: 3.59 K/UL (ref 1.8–7.7)
NEUTROPHILS NFR BLD AUTO: 58.2 % (ref 53–65)
NEUTS SEG NFR BLD MANUAL: 67 % (ref 50–62)
NRBC BLD MANUAL-RTO: ABNORMAL %
PLATELET # BLD AUTO: 238 K/UL (ref 150–400)
POTASSIUM SERPL-SCNC: 4.3 MMOL/L (ref 3.5–5.1)
RBC # BLD AUTO: 3.69 M/UL (ref 4.2–5.4)
SODIUM SERPL-SCNC: 139 MMOL/L (ref 136–145)
WBC # BLD AUTO: 6.16 K/UL (ref 4.5–11)

## 2022-10-04 PROCEDURE — 99307 PR NURSING FAC CARE, SUBSEQ, IMPROVING: ICD-10-PCS | Mod: ,,, | Performed by: NURSE PRACTITIONER

## 2022-10-04 PROCEDURE — 97116 GAIT TRAINING THERAPY: CPT

## 2022-10-04 PROCEDURE — 97110 THERAPEUTIC EXERCISES: CPT

## 2022-10-04 PROCEDURE — 80048 BASIC METABOLIC PNL TOTAL CA: CPT | Performed by: FAMILY MEDICINE

## 2022-10-04 PROCEDURE — 25000003 PHARM REV CODE 250: Performed by: NURSE PRACTITIONER

## 2022-10-04 PROCEDURE — 94761 N-INVAS EAR/PLS OXIMETRY MLT: CPT

## 2022-10-04 PROCEDURE — 97530 THERAPEUTIC ACTIVITIES: CPT

## 2022-10-04 PROCEDURE — 82962 GLUCOSE BLOOD TEST: CPT

## 2022-10-04 PROCEDURE — 99307 SBSQ NF CARE SF MDM 10: CPT | Mod: ,,, | Performed by: NURSE PRACTITIONER

## 2022-10-04 PROCEDURE — 85025 COMPLETE CBC W/AUTO DIFF WBC: CPT | Performed by: FAMILY MEDICINE

## 2022-10-04 PROCEDURE — 63600175 PHARM REV CODE 636 W HCPCS: Performed by: FAMILY MEDICINE

## 2022-10-04 PROCEDURE — 25000003 PHARM REV CODE 250: Performed by: FAMILY MEDICINE

## 2022-10-04 PROCEDURE — 11000004 HC SNF PRIVATE

## 2022-10-04 RX ADMIN — PANTOPRAZOLE SODIUM 40 MG: 40 TABLET, DELAYED RELEASE ORAL at 08:10

## 2022-10-04 RX ADMIN — ENOXAPARIN SODIUM 30 MG: 30 INJECTION SUBCUTANEOUS at 05:10

## 2022-10-04 RX ADMIN — DOCUSATE SODIUM 100 MG: 100 CAPSULE, LIQUID FILLED ORAL at 09:10

## 2022-10-04 RX ADMIN — MELATONIN TAB 3 MG 6 MG: 3 TAB at 09:10

## 2022-10-04 RX ADMIN — THERA TABS 1 TABLET: TAB at 08:10

## 2022-10-04 RX ADMIN — DOCUSATE SODIUM 100 MG: 100 CAPSULE, LIQUID FILLED ORAL at 08:10

## 2022-10-04 RX ADMIN — DILTIAZEM HYDROCHLORIDE 300 MG: 180 CAPSULE, COATED, EXTENDED RELEASE ORAL at 08:10

## 2022-10-04 NOTE — PROGRESS NOTES
[facility] - Medical Surgical Unit  Hospital Medicine  Progress Note    Patient Name: Ajay Bass  MRN: [id number]  Patient Class: IP- Swing   Admission Date: 9/27/2022  Length of Stay: 7 days  Attending Physician: Tiarra Bowie MD  Primary Care Provider: BEA Szymanski        Subjective:     Principal Problem:Weakness        HPI:  Admitted to Swing bed from acute stay secondary to covid pneumonia. Prior to admission pt was ambulating at home with use of walker. Pt is blind and hard of hearing. Lives with daughter. Daughter states she has been eating well. Today will be last dose of IV zithromax and will continue rocephin. Will consult PT and OT.       Overview/Hospital Course:  10/04/2022: Patient is SWB day #8 for PT/OT rehabilitation for weakness. Patient completed IV Rocephin and Azithromycin for pneumonia on 09/30. Patient completed 10 days of COVID isolation today. Daughter reports patient is eating better since diet changed to pureed, but still has to encourage patient to drink. VS stable. Labs today WBC 6.16, H&H 11.6/35.9, Na 139, K+ 4.3, glucose 118,. BUN/CR 18/1.12. Patient denies any new complaints.       Interval History: Off COVID  Isolation today.    Review of Systems   Constitutional:  Negative for appetite change and fever.   HENT:  Positive for hearing loss (Very [place]). Negative for congestion, ear pain, sore throat and trouble swallowing.    Eyes: Negative.  Negative for photophobia, pain and visual disturbance.   Respiratory:  Positive for cough and shortness of breath.    Gastrointestinal:  Negative for abdominal pain, diarrhea, nausea and vomiting.   Genitourinary: Negative.  Negative for dysuria and frequency.   Musculoskeletal:  Positive for gait problem.   Skin: Negative.  Negative for rash.   Neurological:  Negative for dizziness, weakness, light-headedness and headaches.   Hematological: Negative.    Psychiatric/Behavioral: Negative.     All other systems reviewed and are  negative.  Objective:     Vital Signs (Most Recent):  Temp: 98.9 °F (37.2 °C) (10/04/22 0556)  Pulse: 76 (10/04/22 0556)  Resp: 18 (10/04/22 0556)  BP: (!) 127/43 (10/04/22 0556)  SpO2: 98 % (10/04/22 0556)   Vital Signs (24h Range):  Temp:  [98.1 °F (36.7 °C)-99.3 °F (37.4 °C)] 98.9 °F (37.2 °C)  Pulse:  [66-80] 76  Resp:  [16-20] 18  SpO2:  [95 %-98 %] 98 %  BP: (127-160)/(43-71) 127/43     Weight: 77 kg (169 lb 12.1 oz)  Body mass index is 29.14 kg/m².    Intake/Output Summary (Last 24 hours) at 10/4/2022 0715  Last data filed at 10/3/2022 1834  Gross per 24 hour   Intake 600 ml   Output --   Net 600 ml      Physical Exam  Vitals and nursing note reviewed.   Constitutional:       Appearance: Normal appearance. She is well-developed and normal weight. She is not ill-appearing or toxic-appearing.   HENT:      Head: Normocephalic and atraumatic.      Right Ear: External ear normal.      Left Ear: External ear normal.      Nose: Nose normal.      Mouth/Throat:      Mouth: Mucous membranes are moist.   Eyes:      General: Lids are normal.      Conjunctiva/sclera: Conjunctivae normal.      Pupils: Pupils are equal, round, and reactive to light.   Cardiovascular:      Rate and Rhythm: Normal rate and regular rhythm.   Pulmonary:      Effort: Pulmonary effort is normal.      Breath sounds: Normal breath sounds.   Abdominal:      General: Abdomen is flat.      Palpations: Abdomen is soft.      Tenderness: There is no abdominal tenderness.   Musculoskeletal:         General: Normal range of motion.      Right lower leg: No edema.      Left lower leg: No edema.   Skin:     General: Skin is warm and dry.   Neurological:      General: No focal deficit present.      Mental Status: She is alert and easily aroused. Mental status is at baseline.   Psychiatric:         Mood and Affect: Mood normal.         Behavior: Behavior is cooperative.       Significant Labs: All pertinent labs within the past 24 hours have been  reviewed.  BMP:   Recent Labs   Lab 10/04/22  0500   *      K 4.3      CO2 30   BUN 18   CREATININE 1.12*   CALCIUM 9.2     CBC:   Recent Labs   Lab 10/04/22  0500   WBC 6.16   HGB 11.6*   HCT 35.9*          Significant Imaging: No new imaging      Assessment/Plan:      * Weakness  PT EVALUATE AND TREAT    10/04/2022: PT/OT exercise sit to stand with 2 people.  -Continue with PT/OT    Pneumonia due to infectious organism  Continue IV abx    10/04/2022: Lungs clear to ausculation. O2 sat 96% on room air. IV Rocephin and Azithromycin completed on 09/30. BC x 2 no growth at 5 days.      COVID  10/04/2022: Completed ten days of COVID isolation.     Gastroesophageal reflux disease without esophagitis  10/04/2022: Stable. Continues to take Pantoprazole 40 mg one tab by mouth daily      Essential hypertension  10/04/2022: /43, HR 76. Continues to take Diltiazem 300 mg daily, but Zestoretic has been on hold due to elevated creatinine level on admission.  -Continue to monitor BP/HR        VTE Risk Mitigation (From admission, onward)         Ordered     enoxaparin injection 30 mg  Daily         09/27/22 1527     IP VTE HIGH RISK PATIENT  Once         09/27/22 1527     Place sequential compression device  Until discontinued         09/27/22 1527                Discharge Planning   XIMENA:      Code Status: Full Code   Is the patient medically ready for discharge?:     Reason for patient still in hospital (select all that apply): PT / OT recommendations  Discharge Plan A: Home, Home Health        Discussed patient's case, labs and medications with Dr. Figueredo. No change in current POC      BEA Pastrana  Department of Hospital Medicine   [facility] - Medical Surgical Unit

## 2022-10-04 NOTE — PT/OT/SLP PROGRESS
Physical Therapy Treatment    Patient Name:  Ajay Bass   MRN:  [id number]    Recommendations:     Discharge Recommendations:  home, home health PT   Discharge Equipment Recommendations:     Barriers to discharge: None    Assessment:     Ajay Bass is a 89 y.o. female admitted with a medical diagnosis of Weakness.  She presents with the following impairments/functional limitations:  weakness, visual deficits, impaired balance, impaired functional mobility, impaired self care skills, impaired endurance, decreased safety awareness, decreased lower extremity function .    Rehab Prognosis: Good; patient would benefit from acute skilled PT services to address these deficits and reach maximum level of function.    Recent Surgery: * No surgery found *      Plan:     During this hospitalization, patient to be seen   to address the identified rehab impairments via gait training, therapeutic activities, therapeutic exercises, neuromuscular re-education and progress toward the following goals:    Plan of Care Expires:       Subjective     Chief Complaint: Patient without complaints and is agreeable to tx today.  Patient/Family Comments/goals:   Pain/Comfort:         Objective:     Communicated with nursing staff prior to session.  Patient found supine with   upon PT entry to room.     General Precautions: Standard,     Orthopedic Precautions:    Braces:    Respiratory Status: Room air     Functional Mobility:  Bed Mobility:     Rolling Left:  stand by assistance  Rolling Right: stand by assistance  Supine to Sit: minimum assistance  Transfers:     Sit to Stand:  minimum assistance with rolling walker  Bed to Chair: moderate assistance with  rolling walker  using  Stand Pivot  Gait: 15 feet with use of handheld assist      AM-PAC 6 CLICK MOBILITY          Therapeutic Activities and Exercises:   Therapist facilitated transfers sit<>stand with HHA for 6 trials. Therapist also facilitated transfer bed<>chair with mod A  with HHA for 2 trials with therapist providing cues for sequencing and safety. Patient ambulated 75 feet with Min A due to vision deficits for 2 trials with therapist providing cues for sequencing and safety.     Patient left supine with call button in reach..    GOALS:   Multidisciplinary Problems       Physical Therapy Goals          Problem: Physical Therapy    Goal Priority Disciplines Outcome Goal Variances Interventions   Physical Therapy Goal     PT, PT/OT Ongoing, Progressing     Description: Goals to be met by: Discharge     Patient will increase functional independence with mobility by performin. Supine to sit with Stand-by Assistance  2. Sit to stand transfer with Stand-by Assistance  3. Bed to chair transfer with Stand-by Assistance using Rolling Walker  4. Gait  x 75 feet with Contact Guard Assistance using Rolling Walker.                          Time Tracking:     PT Received On: 10/04/22  PT Start Time: 925     PT Stop Time: 955  PT Total Time (min): 30 min  10/04/2022

## 2022-10-04 NOTE — PT/OT/SLP PROGRESS
Occupational Therapy   Treatment    Name: Ajay Bass  MRN: [id number]  Admitting Diagnosis:  Weakness       Recommendations:     Discharge Recommendations:    Discharge Equipment Recommendations:  none  Barriers to discharge:       Assessment:     Ajay Bass is a 89 y.o. female with a medical diagnosis of Weakness.  She presents with the following: Performance deficits affecting function are weakness, impaired endurance, impaired self care skills, impaired functional mobility, visual deficits.     Rehab Prognosis:  Fair; patient would benefit from acute skilled OT services to address these deficits and reach maximum level of function.       Plan:     Patient to be seen 5 x/week to address the above listed problems via self-care/home management, therapeutic activities, therapeutic exercises  Plan of Care Expires: 10/27/22  Plan of Care Reviewed with: patient, caregiver, daughter    Subjective     Pain/Comfort:  Pain Rating 1: 0/10    Objective:     Communicated with: Nursing staff prior to session.  Patient found HOB elevated with oxygen, peripheral IV, pulse ox (continuous) upon OT entry to room.    General Precautions: Standard, droplet, airborne, contact   Orthopedic Precautions:N/A   Braces:  N/A  Respiratory Status: Nasal cannula       Treatment & Education:    Therapeutic Exercises:  Patient completed the following AROM exercises on right and left upper extremities to work on ROM/strengthening in preparation to complete of bed mobility, ADLs and transfers:     -Elbow flexion/extension: 3 sets of 10   -Shoulder flexion: 3 sets of 10   -Chest press: 3 sets of 10   -Hand ball squeezes: x 30 each hand    Therapeutic Activities:  Patient completed Supine to Sit with min assistance  Patient completed Sit to Supine with min assistance  Patient completed Sit <> Stand Transfer with min assistance  Patient completed Bed <> Chair Transfer using Stand Pivot technique with min assistance      Increased time/effort  needed to complete each exercise.        Patient left HOB elevated with all lines intact and call button in reach    GOALS:   Multidisciplinary Problems       Occupational Therapy Goals          Problem: Occupational Therapy    Goal Priority Disciplines Outcome Interventions   Occupational Therapy Goal     OT, PT/OT Ongoing, Progressing    Description: STG:  Pt will perform grooming with setup  Pt will bathe with Mod A  Pt will perform UE dressing with Mod A  Pt will perform LE dressing with Mod A  Pt will sit EOB x 10 min with SBA assistance  Pt will transfer bed/chair/bsc with Min A  Pt will perform standing task x 3 min with CGA assistance  Pt will tolerate 5 minutes of tx without fatigue      LT.Restore to max I with self care and mobility.                          Time Tracking:     OT Date of Treatment: 10/04/22  OT Start Time: 55  OT Stop Time: 1019  OT Total Time (min): 24 min    Billable Minutes:Therapeutic Activity 10  Therapeutic Exercise 14    OT/DUDLEY: OT          10/4/2022

## 2022-10-04 NOTE — ASSESSMENT & PLAN NOTE
Continue IV abx    10/04/2022: Lungs clear to ausculation. O2 sat 96% on room air. IV Rocephin and Azithromycin completed on 09/30. BC x 2 no growth at 5 days.

## 2022-10-04 NOTE — ASSESSMENT & PLAN NOTE
10/04/2022: /43, HR 76. Continues to take Diltiazem 300 mg daily, but Zestoretic has been on hold due to elevated creatinine level on admission.  -Continue to monitor BP/HR

## 2022-10-04 NOTE — HOSPITAL COURSE
10/04/2022: Patient is SWB day #8 for PT/OT rehabilitation for weakness. Patient completed IV Rocephin and Azithromycin for pneumonia on 09/30. Patient completed 10 days of COVID isolation today. Daughter reports patient is eating better since diet changed to pureed, but still has to encourage patient to drink. VS stable. Labs today WBC 6.16, H&H 11.6/35.9, Na 139, K+ 4.3, glucose 118,. BUN/CR 18/1.12. Patient denies any new complaints.     10/07/2022: Pt will be discharged home today. Has completed IV abx for pneumonia. Now off of isolation. Pt has been eating well. Will have [place] .

## 2022-10-04 NOTE — ASSESSMENT & PLAN NOTE
PT EVALUATE AND TREAT    10/04/2022: PT/OT exercise sit to stand with 2 people.  -Continue with PT/OT

## 2022-10-04 NOTE — SUBJECTIVE & OBJECTIVE
Interval History: Off COVID  Isolation today.    Review of Systems   Constitutional:  Negative for appetite change and fever.   HENT:  Positive for hearing loss (Very [place]). Negative for congestion, ear pain, sore throat and trouble swallowing.    Eyes: Negative.  Negative for photophobia, pain and visual disturbance.   Respiratory:  Positive for cough and shortness of breath.    Gastrointestinal:  Negative for abdominal pain, diarrhea, nausea and vomiting.   Genitourinary: Negative.  Negative for dysuria and frequency.   Musculoskeletal:  Positive for gait problem.   Skin: Negative.  Negative for rash.   Neurological:  Negative for dizziness, weakness, light-headedness and headaches.   Hematological: Negative.    Psychiatric/Behavioral: Negative.     All other systems reviewed and are negative.  Objective:     Vital Signs (Most Recent):  Temp: 98.9 °F (37.2 °C) (10/04/22 0556)  Pulse: 76 (10/04/22 0556)  Resp: 18 (10/04/22 0556)  BP: (!) 127/43 (10/04/22 0556)  SpO2: 98 % (10/04/22 0556)   Vital Signs (24h Range):  Temp:  [98.1 °F (36.7 °C)-99.3 °F (37.4 °C)] 98.9 °F (37.2 °C)  Pulse:  [66-80] 76  Resp:  [16-20] 18  SpO2:  [95 %-98 %] 98 %  BP: (127-160)/(43-71) 127/43     Weight: 77 kg (169 lb 12.1 oz)  Body mass index is 29.14 kg/m².    Intake/Output Summary (Last 24 hours) at 10/4/2022 0715  Last data filed at 10/3/2022 1834  Gross per 24 hour   Intake 600 ml   Output --   Net 600 ml      Physical Exam  Vitals and nursing note reviewed.   Constitutional:       Appearance: Normal appearance. She is well-developed and normal weight. She is not ill-appearing or toxic-appearing.   HENT:      Head: Normocephalic and atraumatic.      Right Ear: External ear normal.      Left Ear: External ear normal.      Nose: Nose normal.      Mouth/Throat:      Mouth: Mucous membranes are moist.   Eyes:      General: Lids are normal.      Conjunctiva/sclera: Conjunctivae normal.      Pupils: Pupils are equal, round, and reactive to  light.   Cardiovascular:      Rate and Rhythm: Normal rate and regular rhythm.   Pulmonary:      Effort: Pulmonary effort is normal.      Breath sounds: Normal breath sounds.   Abdominal:      General: Abdomen is flat.      Palpations: Abdomen is soft.      Tenderness: There is no abdominal tenderness.   Musculoskeletal:         General: Normal range of motion.      Right lower leg: No edema.      Left lower leg: No edema.   Skin:     General: Skin is warm and dry.   Neurological:      General: No focal deficit present.      Mental Status: She is alert and easily aroused. Mental status is at baseline.   Psychiatric:         Mood and Affect: Mood normal.         Behavior: Behavior is cooperative.       Significant Labs: All pertinent labs within the past 24 hours have been reviewed.  BMP:   Recent Labs   Lab 10/04/22  0500   *      K 4.3      CO2 30   BUN 18   CREATININE 1.12*   CALCIUM 9.2     CBC:   Recent Labs   Lab 10/04/22  0500   WBC 6.16   HGB 11.6*   HCT 35.9*          Significant Imaging: No new imaging

## 2022-10-05 LAB — GLUCOSE SERPL-MCNC: 112 MG/DL (ref 70–105)

## 2022-10-05 PROCEDURE — 94761 N-INVAS EAR/PLS OXIMETRY MLT: CPT

## 2022-10-05 PROCEDURE — 63600175 PHARM REV CODE 636 W HCPCS: Performed by: FAMILY MEDICINE

## 2022-10-05 PROCEDURE — 97110 THERAPEUTIC EXERCISES: CPT

## 2022-10-05 PROCEDURE — 97530 THERAPEUTIC ACTIVITIES: CPT

## 2022-10-05 PROCEDURE — 25000003 PHARM REV CODE 250: Performed by: NURSE PRACTITIONER

## 2022-10-05 PROCEDURE — 11000004 HC SNF PRIVATE

## 2022-10-05 PROCEDURE — 97116 GAIT TRAINING THERAPY: CPT

## 2022-10-05 PROCEDURE — 82962 GLUCOSE BLOOD TEST: CPT

## 2022-10-05 PROCEDURE — 97535 SELF CARE MNGMENT TRAINING: CPT

## 2022-10-05 PROCEDURE — 25000003 PHARM REV CODE 250: Performed by: FAMILY MEDICINE

## 2022-10-05 RX ORDER — GUAIFENESIN 100 MG/5ML
200 SOLUTION ORAL EVERY 6 HOURS PRN
Status: DISCONTINUED | OUTPATIENT
Start: 2022-10-05 | End: 2022-10-07 | Stop reason: HOSPADM

## 2022-10-05 RX ADMIN — ENOXAPARIN SODIUM 30 MG: 30 INJECTION SUBCUTANEOUS at 05:10

## 2022-10-05 RX ADMIN — DOCUSATE SODIUM 100 MG: 100 CAPSULE, LIQUID FILLED ORAL at 09:10

## 2022-10-05 RX ADMIN — DILTIAZEM HYDROCHLORIDE 300 MG: 180 CAPSULE, COATED, EXTENDED RELEASE ORAL at 09:10

## 2022-10-05 RX ADMIN — THERA TABS 1 TABLET: TAB at 09:10

## 2022-10-05 RX ADMIN — PANTOPRAZOLE SODIUM 40 MG: 40 TABLET, DELAYED RELEASE ORAL at 09:10

## 2022-10-05 NOTE — PLAN OF CARE
Completed Rocephin IV and Zithromax IV. WBC 6.16. diet changed to pureed. Will continue medical plan.

## 2022-10-05 NOTE — PT/OT/SLP PROGRESS
Physical Therapy Treatment    Patient Name:  Ajay Bass   MRN:  [id number]    Recommendations:     Discharge Recommendations:  home, home health PT   Discharge Equipment Recommendations:     Barriers to discharge: None    Assessment:     Ajay Bass is a 89 y.o. female admitted with a medical diagnosis of Weakness.  She presents with the following impairments/functional limitations:  weakness, visual deficits, impaired balance, impaired functional mobility, impaired self care skills, impaired endurance, decreased safety awareness, decreased lower extremity function .    Rehab Prognosis: Good; patient would benefit from acute skilled PT services to address these deficits and reach maximum level of function.    Recent Surgery: * No surgery found *      Plan:     During this hospitalization, patient to be seen   to address the identified rehab impairments via gait training, therapeutic activities, therapeutic exercises, neuromuscular re-education and progress toward the following goals:    Plan of Care Expires:       Subjective     Chief Complaint: Patient without complaints and is agreeable to tx today.  Patient/Family Comments/goals:   Pain/Comfort:         Objective:     Communicated with nursing staff prior to session.  Patient found supine with   upon PT entry to room.     General Precautions: Standard,     Orthopedic Precautions:    Braces:    Respiratory Status: Room air     Functional Mobility:  Bed Mobility:     Rolling Left:  stand by assistance  Rolling Right: stand by assistance  Supine to Sit: minimum assistance  Transfers:     Sit to Stand:  minimum assistance with rolling walker  Bed to Chair: moderate assistance with  rolling walker  using  Stand Pivot  Gait: 15 feet with use of handheld assist      AM-PAC 6 CLICK MOBILITY          Therapeutic Activities and Exercises:   Therapist facilitated transfers sit<>stand with HHA for 6 trials. Therapist also facilitated transfer bed<>chair with mod A  with HHA for 2 trials with therapist providing cues for sequencing and safety. Patient ambulated 150 feet with CGA and use of front wheeled walker    Patient left supine with call button in reach..    GOALS:   Multidisciplinary Problems       Physical Therapy Goals          Problem: Physical Therapy    Goal Priority Disciplines Outcome Goal Variances Interventions   Physical Therapy Goal     PT, PT/OT Ongoing, Progressing     Description: Goals to be met by: Discharge     Patient will increase functional independence with mobility by performin. Supine to sit with Stand-by Assistance  2. Sit to stand transfer with Stand-by Assistance  3. Bed to chair transfer with Stand-by Assistance using Rolling Walker  4. Gait  x 75 feet with Contact Guard Assistance using Rolling Walker.                          Time Tracking:     PT Received On: 10/05/22  PT Start Time: 1315     PT Stop Time: 1340  PT Total Time (min): 25 min  10/05/2022

## 2022-10-05 NOTE — PLAN OF CARE
Per swb meeting -planned dc for 10/7/22. SS called Rach, daughter, who is agreeable with dc plan. IM explained and obtained. Has needed DME. Choice for [facility]. SS notified Nimo with [facility] of planned dc Friday. Ss following.

## 2022-10-05 NOTE — PT/OT/SLP PROGRESS
Occupational Therapy   Treatment    Name: Ajay Bass  MRN: [id number]  Admitting Diagnosis:  Weakness       Recommendations:     Discharge Recommendations:    Discharge Equipment Recommendations:  none  Barriers to discharge:       Assessment:     Ajay Bass is a 89 y.o. female with a medical diagnosis of Weakness.  She presents with the following: Performance deficits affecting function are weakness, impaired endurance, impaired self care skills, impaired functional mobility, visual deficits.     Rehab Prognosis:  Fair; patient would benefit from acute skilled OT services to address these deficits and reach maximum level of function.       Plan:     Patient to be seen 5 x/week to address the above listed problems via self-care/home management, therapeutic activities, therapeutic exercises  Plan of Care Expires: 10/27/22  Plan of Care Reviewed with: patient, caregiver, daughter    Subjective     Pain/Comfort:  Pain Rating 1: 0/10    Objective:     Communicated with: Nursing staff prior to session.  Patient found HOB elevated with oxygen, peripheral IV, pulse ox (continuous) upon OT entry to room.    General Precautions: Standard, fall  Orthopedic Precautions:N/A   Braces:  N/A  Respiratory Status: room air       Treatment & Education:    Self care Management:  ADLs:  -Toileting: Pt performed toileting with minimum assistance for transfer with hand-held A at Bedside Commode.     Therapeutic Exercises:  Patient completed the following AROM exercises on right and left upper extremities to work on ROM/strengthening in preparation to complete of bed mobility, ADLs and transfers:     -Elbow flexion/extension: 3 sets of 10   -Shoulder flexion: 3 sets of 10   -Shoulder horizontal ABD/ADD: 3 sets of 10   -Hand ball squeezes: x 2 minutes each hand   -Elbow flexion/extension: 2 sets of 10 with 1# dumbbell    Therapeutic Activities:  Patient completed Supine to Sit with min assistance  Patient completed Sit to Supine  with max assistance  Patient completed Sit <> Stand Transfer with min assistance  Patient completed Bed <> Chair Transfer using Stand Pivot technique with min assistance      Increased time/effort needed to complete each exercise.        Patient left HOB elevated with all lines intact and call button in reach    GOALS:   Multidisciplinary Problems       Occupational Therapy Goals          Problem: Occupational Therapy    Goal Priority Disciplines Outcome Interventions   Occupational Therapy Goal     OT, PT/OT Ongoing, Progressing    Description: STG:  Pt will perform grooming with setup  Pt will bathe with Mod A  Pt will perform UE dressing with Mod A  Pt will perform LE dressing with Mod A  Pt will sit EOB x 10 min with SBA assistance  Pt will transfer bed/chair/bsc with Min A  Pt will perform standing task x 3 min with CGA assistance  Pt will tolerate 5 minutes of tx without fatigue      LT.Restore to max I with self care and mobility.                          Time Tracking:     OT Date of Treatment: 10/05/22  OT Start Time: 1247  OT Stop Time: 1327  OT Total Time (min): 40 min    Billable Minutes:Self Care/Home Management 8  Therapeutic Activity 15  Therapeutic Exercise 17    OT/DUDLEY: OT          10/5/2022

## 2022-10-06 LAB — GLUCOSE SERPL-MCNC: 95 MG/DL (ref 70–105)

## 2022-10-06 PROCEDURE — 97110 THERAPEUTIC EXERCISES: CPT | Mod: CQ

## 2022-10-06 PROCEDURE — 11000004 HC SNF PRIVATE

## 2022-10-06 PROCEDURE — 97116 GAIT TRAINING THERAPY: CPT | Mod: CQ

## 2022-10-06 PROCEDURE — 97110 THERAPEUTIC EXERCISES: CPT | Mod: CO

## 2022-10-06 PROCEDURE — 25000003 PHARM REV CODE 250: Performed by: FAMILY MEDICINE

## 2022-10-06 PROCEDURE — 63600175 PHARM REV CODE 636 W HCPCS: Performed by: FAMILY MEDICINE

## 2022-10-06 PROCEDURE — 97530 THERAPEUTIC ACTIVITIES: CPT | Mod: CO

## 2022-10-06 PROCEDURE — 25000003 PHARM REV CODE 250: Performed by: NURSE PRACTITIONER

## 2022-10-06 PROCEDURE — 94761 N-INVAS EAR/PLS OXIMETRY MLT: CPT

## 2022-10-06 RX ADMIN — PANTOPRAZOLE SODIUM 40 MG: 40 TABLET, DELAYED RELEASE ORAL at 09:10

## 2022-10-06 RX ADMIN — DILTIAZEM HYDROCHLORIDE 300 MG: 180 CAPSULE, COATED, EXTENDED RELEASE ORAL at 09:10

## 2022-10-06 RX ADMIN — DOCUSATE SODIUM 100 MG: 100 CAPSULE, LIQUID FILLED ORAL at 09:10

## 2022-10-06 RX ADMIN — ENOXAPARIN SODIUM 30 MG: 30 INJECTION SUBCUTANEOUS at 05:10

## 2022-10-06 RX ADMIN — GUAIFENESIN 200 MG: 200 SOLUTION ORAL at 10:10

## 2022-10-06 RX ADMIN — THERA TABS 1 TABLET: TAB at 09:10

## 2022-10-06 NOTE — PT/OT/SLP PROGRESS
Occupational Therapy   Treatment    Name: Ajay Bass  MRN: [id number]  Admitting Diagnosis:  Weakness       Recommendations:     Discharge Recommendations: home with home health  Discharge Equipment Recommendations:  none  Barriers to discharge:  None    Assessment:     Ajay Bass is a 89 y.o. female with a medical diagnosis of Weakness.  She presents with the following Performance deficits affecting function are weakness, decreased upper extremity function, impaired balance, impaired endurance, impaired self care skills, impaired functional mobility, decreased coordination, visual deficits.     Rehab Prognosis:  Good; patient would benefit from acute skilled OT services to address these deficits and reach maximum level of function.       Plan:     Patient to be seen 5 x/week to address the above listed problems via self-care/home management, therapeutic activities, therapeutic exercises  Plan of Care Expires: 10/27/22  Plan of Care Reviewed with: patient, caregiver, daughter    Subjective   Patient was sitting up in her recliner chair in therapy gym after PT session upon IGLESIAS arrival. Patient stated she was feeling fine with no complaints of pain and was agreeable to participate in todays session.   Pain/Comfort:  Pain Rating 1: 0/10    Objective:     Communicated with: nursing staff prior to session.  Patient found up in chair with   upon OT entry to room.    General Precautions: Standard, fall   Orthopedic Precautions:N/A   Braces: N/A  Respiratory Status: Room air     Occupational Performance:     Bed Mobility:    Patient completed Sit to Supine with minimum assistance     Functional Mobility/Transfers:  Patient completed Sit <> Stand Transfer with minimum assistance  with  rolling walker   Patient completed Bed <> Chair Transfer using Step Transfer technique with minimum assistance with rolling walker  Patient completed Chair <> Mat Step Transfer technique with minimum assistance with rolling  walker    Therapeutic exercises:  Patient completed the following AROM exercises on right and left upper extremities  to work on ROM/strengthening in preparation to complete of bed mobility, ADLs and transfers:     -Elbow flexion/extension: 3 sets of 10   -Shoulder flexion: 3 sets of 10   -Chest press: 3 sets of 10   -Internal Rotation/External Rotation: 3 sets of 10   -Hand squeezes: 3 sets of 10    Increased time/effort needed to complete each exercise.     Therapeutic activities:  Patient completed 5 minutes on UBE ergometer to work on UB strength and endurance in order to complete ADLs and transfers w/ less A.   Pt completed 7 sit to stand transfers with minimal assist to work on tricep strength, stand tolerance and endurance in preparation to complete ADLs and transfers from different surfaces such as BSC, chair, etc. with less assistance. Increased time/effort needed.   While standing, pt engaged in standing tolerance activity to increase overall tolerance and static/dynamic stand balance in order to complete ADLs standing at sink with less assistance. Pt stood 4:48 minutes with Minimal Assistance before requiring a rest break.     Patient left HOB elevated with call button in reach, RN notified, and CNA present    GOALS:   Multidisciplinary Problems       Occupational Therapy Goals          Problem: Occupational Therapy    Goal Priority Disciplines Outcome Interventions   Occupational Therapy Goal     OT, PT/OT Ongoing, Progressing    Description: STG:  Pt will perform grooming with setup  Pt will bathe with Mod A  Pt will perform UE dressing with Mod A  Pt will perform LE dressing with Mod A  Pt will sit EOB x 10 min with SBA assistance  Pt will transfer bed/chair/bsc with Min A  Pt will perform standing task x 3 min with CGA assistance  Pt will tolerate 5 minutes of tx without fatigue      LT.Restore to max I with self care and mobility.                          Time Tracking:     OT Date of Treatment:  10/06/22  OT Start Time: 1315  OT Stop Time: 1345  OT Total Time (min): 30 min    Billable Minutes:Therapeutic Activity 15 minutes  Therapeutic Exercise 15 minutes    OT/DUDLEY: DUDLEY BUCKNER Visit Number: 1    DUDLEY Dotson  10/6/2022  2:44 PM

## 2022-10-06 NOTE — PT/OT/SLP PROGRESS
Physical Therapy Treatment    Patient Name:  Ajay Bass   MRN:  [id number]    Recommendations:     Discharge Recommendations:  home, home health PT   Discharge Equipment Recommendations:     Barriers to discharge: Decreased caregiver support    Assessment:     Ajay Bass is a 89 y.o. female admitted with a medical diagnosis of Weakness.  She presents with the following impairments/functional limitations:  weakness, impaired endurance, impaired functional mobility, impaired self care skills, visual deficits, other (comment) .    Rehab Prognosis: Good; patient would benefit from acute skilled PT services to address these deficits and reach maximum level of function.    Recent Surgery: * No surgery found *      Plan:     During this hospitalization, patient to be seen 5 x/week to address the identified rehab impairments via gait training, therapeutic exercises and progress toward the following goals:    Plan of Care Expires:       Subjective     Chief Complaint: pt had no complaints   Patient/Family Comments/goals: increased functional mobility  Pain/Comfort:  Pain Rating 1: 0/10      Objective:     Communicated with pt prior to session.  Patient found supine with   upon PT entry to room.     General Precautions: Standard,     Orthopedic Precautions:    Braces:    Respiratory Status: Room air     Functional Mobility:  Bed Mobility:     Supine to Sit: stand by assistance and contact guard assistance  Transfers:     Sit to Stand:  contact guard assistance with rolling walker  Gait: Pt ambulated 150 ft min/CGA with FWW, therapist assisted pt with direction only due to pt visual deficits.       AM-PAC 6 CLICK MOBILITY          Therapeutic Activities and Exercises:   Pt completed x 10 min seated NuStep to promote increased BLE strengthening.    Patient left up in chair with  IGLESIAS  present..    GOALS:   Multidisciplinary Problems       Physical Therapy Goals          Problem: Physical Therapy    Goal Priority  Disciplines Outcome Goal Variances Interventions   Physical Therapy Goal     PT, PT/OT Ongoing, Progressing     Description: Goals to be met by: Discharge     Patient will increase functional independence with mobility by performin. Supine to sit with Stand-by Assistance  2. Sit to stand transfer with Stand-by Assistance  3. Bed to chair transfer with Stand-by Assistance using Rolling Walker  4. Gait  x 75 feet with Contact Guard Assistance using Rolling Walker.                          Time Tracking:     PT Received On: 10/06/22  PT Start Time: 1250     PT Stop Time: 1315  PT Total Time (min): 25 min     Billable Minutes: Gait Training 15 and Therapeutic Exercise 10    Treatment Type: Treatment  PT/PTA: PTA     PTA Visit Number: 1     10/06/2022

## 2022-10-06 NOTE — PROGRESS NOTES
Wt: 169#  Pt was sleeping during RDN visit.  Her diet was changed to Pureed and her itnake has improved per staff. Intake ~45% per recall plus she receives Ensure Enlive one carton po BID.  She is for D/C tomorrow. Lab reviewed.

## 2022-10-06 NOTE — PLAN OF CARE
Problem: Adult Inpatient Plan of Care  Goal: Plan of Care Review  10/6/2022 0059 by Mei Medina RN  Outcome: Ongoing, Progressing  10/6/2022 0048 by Mei Medina RN  Outcome: Ongoing, Progressing     Problem: Infection (Pneumonia)  Goal: Resolution of Infection Signs and Symptoms  10/6/2022 0059 by Mei Medina RN  Outcome: Ongoing, Progressing  10/6/2022 0048 by Mei Medina RN  Outcome: Ongoing, Progressing     Problem: Fall Injury Risk  Goal: Absence of Fall and Fall-Related Injury  10/6/2022 0059 by Mei Medina RN  Outcome: Ongoing, Progressing  10/6/2022 0048 by Mei Medina RN  Outcome: Ongoing, Progressing

## 2022-10-07 VITALS
DIASTOLIC BLOOD PRESSURE: 56 MMHG | OXYGEN SATURATION: 96 % | HEIGHT: 64 IN | SYSTOLIC BLOOD PRESSURE: 126 MMHG | HEART RATE: 68 BPM | BODY MASS INDEX: 28.98 KG/M2 | TEMPERATURE: 98 F | WEIGHT: 169.75 LBS | RESPIRATION RATE: 17 BRPM

## 2022-10-07 LAB — GLUCOSE SERPL-MCNC: 100 MG/DL (ref 70–105)

## 2022-10-07 PROCEDURE — 99315 NF DSCHRG MGMT 30 MIN/LESS: CPT | Mod: ,,, | Performed by: FAMILY MEDICINE

## 2022-10-07 PROCEDURE — 25000003 PHARM REV CODE 250: Performed by: NURSE PRACTITIONER

## 2022-10-07 PROCEDURE — 94761 N-INVAS EAR/PLS OXIMETRY MLT: CPT

## 2022-10-07 PROCEDURE — 99315 PR NURSING FAC DISCHRGE DAY,1-30 MIN: ICD-10-PCS | Mod: ,,, | Performed by: FAMILY MEDICINE

## 2022-10-07 PROCEDURE — 25000003 PHARM REV CODE 250: Performed by: FAMILY MEDICINE

## 2022-10-07 RX ADMIN — PANTOPRAZOLE SODIUM 40 MG: 40 TABLET, DELAYED RELEASE ORAL at 09:10

## 2022-10-07 RX ADMIN — DILTIAZEM HYDROCHLORIDE 300 MG: 180 CAPSULE, COATED, EXTENDED RELEASE ORAL at 09:10

## 2022-10-07 RX ADMIN — THERA TABS 1 TABLET: TAB at 09:10

## 2022-10-07 RX ADMIN — DOCUSATE SODIUM 100 MG: 100 CAPSULE, LIQUID FILLED ORAL at 09:10

## 2022-10-07 NOTE — PLAN OF CARE
Patient discharged home with family and services of home health.  Problem: Adult Inpatient Plan of Care  Goal: Plan of Care Review  Outcome: Met  Goal: Patient-Specific Goal (Individualized)  Outcome: Met  Goal: Absence of Hospital-Acquired Illness or Injury  Outcome: Met  Goal: Optimal Comfort and Wellbeing  Outcome: Met  Goal: Readiness for Transition of Care  Outcome: Met     Problem: Fluid Imbalance (Pneumonia)  Goal: Fluid Balance  Outcome: Met     Problem: Infection (Pneumonia)  Goal: Resolution of Infection Signs and Symptoms  Outcome: Met     Problem: Respiratory Compromise (Pneumonia)  Goal: Effective Oxygenation and Ventilation  Outcome: Met     Problem: Skin Injury Risk Increased  Goal: Skin Health and Integrity  Outcome: Met     Problem: Fall Injury Risk  Goal: Absence of Fall and Fall-Related Injury  Outcome: Met

## 2022-10-07 NOTE — DISCHARGE SUMMARY
[facility]  Hospital Medicine  Discharge Summary      Patient Name: Ajay Bass  MRN: [id number]  Patient Class: IP- Swing  Admission Date: 9/27/2022  Hospital Length of Stay: 10 days  Discharge Date and Time:  10/07/2022 8:06 AM  Attending Physician: Tiarra Bowie MD   Discharging Provider: Tiarra Bowie MD  Primary Care Provider: BEA Szymanski      HPI:   Admitted to Swing bed from acute stay secondary to covid pneumonia. Prior to admission pt was ambulating at home with use of walker. Pt is blind and hard of hearing. Lives with daughter. Daughter states she has been eating well. Today will be last dose of IV zithromax and will continue rocephin. Will consult PT and OT.       * No surgery found *      Hospital Course:   10/04/2022: Patient is SWB day #8 for PT/OT rehabilitation for weakness. Patient completed IV Rocephin and Azithromycin for pneumonia on 09/30. Patient completed 10 days of COVID isolation today. Daughter reports patient is eating better since diet changed to pureed, but still has to encourage patient to drink. VS stable. Labs today WBC 6.16, H&H 11.6/35.9, Na 139, K+ 4.3, glucose 118,. BUN/CR 18/1.12. Patient denies any new complaints.     10/07/2022: Pt will be discharged home today. Has completed IV abx for pneumonia. Now off of isolation. Pt has been eating well. Will have [facility].        Goals of Care Treatment Preferences:  Code Status: Full Code      Consults:     No new Assessment & Plan notes have been filed under this hospital service since the last note was generated.  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    Specialty: Nurse Practitioner  Why: follow up:10-@10:00  Contact information:  [address] [place] [address] [address] [place] [address]  [phone]                       Patient Instructions:   No discharge procedures on file.    Significant Diagnostic Studies: Labs: BMP: No results for input(s): GLU, NA, K, CL, CO2, BUN, CREATININE, CALCIUM, MG in the last 48 hours. and CMP No results for input(s): NA, K, CL, CO2, GLU, BUN, CREATININE, CALCIUM, PROT, ALBUMIN, BILITOT, ALKPHOS, AST, ALT, ANIONGAP, ESTGFRAFRICA, EGFRNONAA in the last 48 hours.  Microbiology: Blood Culture No results found for: LABBLOO and Urine Culture  No results found for: LABURIN    Pending Diagnostic Studies:     None         Medications:  Reconciled Home Medications:      Medication List      CONTINUE taking these medications    CENTRUM SILVER WOMEN 8 mg iron-400 mcg-300 mcg Tab  Generic drug: multivit-min-iron-FA-lutein  Take 1 tablet by mouth once daily.     diltiaZEM 300 MG 24 hr capsule  Commonly known as: CARDIZEM CD  Take 1 capsule (300 mg total) by mouth once daily.     lisinopriL-hydrochlorothiazide 20-12.5 mg per tablet  Commonly known as: PRINZIDE,ZESTORETIC  Take 1 tablet by mouth once daily.     pantoprazole 40 MG tablet  Commonly known as: PROTONIX  Take 1 tablet (40 mg total) by mouth once daily.            Indwelling Lines/Drains at time of discharge:   Lines/Drains/Airways     None                 Time spent on the discharge of patient: 30 minutes         Tiarra Bowie MD  Department of Hospital Medicine  [facility]

## 2022-10-07 NOTE — NURSING
Patient discharged home with daughter. Pt wheeled out front door to pov. No distress at time of discharge. Personnal items gathered and removed by daughter,

## 2022-10-07 NOTE — PLAN OF CARE
Problem: Adult Inpatient Plan of Care  Goal: Plan of Care Review  Outcome: Ongoing, Progressing     Problem: Respiratory Compromise (Pneumonia)  Goal: Effective Oxygenation and Ventilation  Outcome: Ongoing, Progressing     Problem: Fall Injury Risk  Goal: Absence of Fall and Fall-Related Injury  Outcome: Ongoing, Progressing

## 2022-10-07 NOTE — NURSING
Discharge instructions given to pt's daughter in writing and verbally reviewed them with the patent and daughter. Daughter voiced understanding. She states that the protonix is new and will need a RX. Called in Protonix 40mg take 1 tablet po daily with a 30# and no refills after ok'ing it with Dr Bowie

## 2022-10-07 NOTE — PLAN OF CARE
[facility]  Discharge Final Note    Primary Care Provider: BEA Szymanski    Expected Discharge Date:     Final Discharge Note (most recent)       Final Note - 10/07/22 0929          Final Note    Assessment Type Final Discharge Note     Anticipated Discharge Disposition Home-Health Care Svc        Post-Acute Status    Post-Acute Authorization Home Health     Home Health Status Referrals Sent     Patient choice form signed by patient/caregiver List with quality metrics by geographic area provided                     Important Message from Medicare  Important Message from Medicare regarding Discharge Appeal Rights: Given to patient/caregiver, Explained to patient/caregiver, Signed/date by patient/caregiver     Date IMM was signed: 10/05/22  Time IMM was signed: 1157    Contact Info       BEA Szymanski   Specialty: Nurse Practitioner   Relationship: PCP - General    [address] [place] [address] [address] [place] [address]   Phone: [phone]       Next Steps: Follow up on 10/13/2022    Instructions: follow up:10-@10:00          Pt discharging home daughter who she lives with and [facility] she is to follow with Laverne Briggs .PCP

## 2022-10-10 ENCOUNTER — TELEPHONE (OUTPATIENT)
Dept: FAMILY MEDICINE | Facility: CLINIC | Age: 87
End: 2022-10-10

## 2022-10-10 NOTE — TELEPHONE ENCOUNTER
10/10/22-spoke with pt daughter, Rach, reports pt is doing well. States she has eaten breakfast and she has started giving her ensure in the afternoon since she doesn't eat as much in the evenings. Denies she has exhibited any shortness of breath or complaints of chest pain. States she does have a dry cough but she is givng her robitussin plain. Encouraged to give with plenty of water to help with secretions. Vu. States she does cough more when lying flat. States she is urinating and bowels are moving wnl. Denies she has had any fever. States her hh nurse came sat. Reviewed and discussed all discharge medications. Informed of f/u appt.Instructed to bring all meds to follow up visit and to call office for questions/concerns. Also to seek medical attention for any new or worsening sx. Vu. TParkmanDONNA

## 2022-10-13 ENCOUNTER — OFFICE VISIT (OUTPATIENT)
Dept: FAMILY MEDICINE | Facility: CLINIC | Age: 87
End: 2022-10-13
Payer: MEDICARE

## 2022-10-13 VITALS
HEART RATE: 72 BPM | TEMPERATURE: 98 F | HEIGHT: 64 IN | WEIGHT: 162 LBS | DIASTOLIC BLOOD PRESSURE: 70 MMHG | OXYGEN SATURATION: 97 % | BODY MASS INDEX: 27.66 KG/M2 | SYSTOLIC BLOOD PRESSURE: 120 MMHG

## 2022-10-13 DIAGNOSIS — Z09 HOSPITAL DISCHARGE FOLLOW-UP: Primary | ICD-10-CM

## 2022-10-13 DIAGNOSIS — K21.9 GASTROESOPHAGEAL REFLUX DISEASE WITHOUT ESOPHAGITIS: ICD-10-CM

## 2022-10-13 DIAGNOSIS — I10 ESSENTIAL HYPERTENSION: ICD-10-CM

## 2022-10-13 PROCEDURE — 99495 TCM SERVICES (MODERATE COMPLEXITY): ICD-10-PCS | Mod: ,,, | Performed by: NURSE PRACTITIONER

## 2022-10-13 PROCEDURE — 99495 TRANSJ CARE MGMT MOD F2F 14D: CPT | Mod: ,,, | Performed by: NURSE PRACTITIONER

## 2022-10-13 RX ORDER — PANTOPRAZOLE SODIUM 40 MG/1
40 TABLET, DELAYED RELEASE ORAL DAILY
Qty: 90 TABLET | Refills: 1 | Status: SHIPPED | OUTPATIENT
Start: 2022-10-13 | End: 2023-08-07 | Stop reason: SDUPTHER

## 2022-10-13 RX ORDER — DILTIAZEM HYDROCHLORIDE 300 MG/1
300 CAPSULE, COATED, EXTENDED RELEASE ORAL DAILY
Qty: 90 CAPSULE | Refills: 1 | Status: SHIPPED | OUTPATIENT
Start: 2022-10-13 | End: 2023-08-07 | Stop reason: SDUPTHER

## 2022-10-13 RX ORDER — LISINOPRIL AND HYDROCHLOROTHIAZIDE 12.5; 2 MG/1; MG/1
1 TABLET ORAL DAILY
Qty: 90 TABLET | Refills: 1 | Status: SHIPPED | OUTPATIENT
Start: 2022-10-13 | End: 2023-08-07 | Stop reason: SDUPTHER

## 2022-10-13 NOTE — PROGRESS NOTES
BEA Szymanski   [facility]  [address] [address] [place] [address]  [phone]      PATIENT NAME: Ajay Bass  : 1922  DATE: 10/13/22  MRN: [id number]      Billing Provider: BEA Szymanski  Level of Service:   Patient PCP Information       Provider PCP Type    BEA Szymanski General            Reason for Visit / Chief Complaint: Follow-up (Hospital for pneumonia and covid )       Update PCP  Update Chief Complaint         History of Present Illness / Problem Focused Workflow     89 year old female presents for hospital follow up   She was recently discharged from [facility] after having pnuemonia  Daughter denies any problems today  She lives with patient and is caregiver  Patient is hard of hearing; reports she is doing well    Hx of  GERD, hypertension, DM    Review of Systems     Review of Systems   Constitutional:  Negative for chills and fever.   HENT:  Positive for hearing loss. Negative for congestion.    Eyes:         Decreased vision related to age   Respiratory:  Negative for cough and shortness of breath.    Cardiovascular:  Negative for chest pain.   Endocrine: Negative for polydipsia and polyuria.   Musculoskeletal:  Positive for arthralgias and gait problem.        Currently in wheelchair; limited mobility     Allergic/Immunologic: Negative for environmental allergies.   Neurological:  Negative for dizziness and headaches.   Psychiatric/Behavioral:  Negative for agitation and dysphoric mood.      Medical / Social / Family History     Past Medical History:   Diagnosis Date    Alzheimer's disease, unspecified (CODE)     Blind     Diabetes mellitus, type 2     GERD (gastroesophageal reflux disease)     [place] (hard of hearing)     Hypertension     Major depressive disorder, single episode, unspecified        Past Surgical History:   Procedure Laterality Date    TOTAL HIP ARTHROPLASTY         Social History  Ms.  reports that  she has never smoked. She has never used smokeless tobacco. She reports that she does not drink alcohol and does not use drugs.    Family History  Ms.'s family history is not on file.    Medications and Allergies     Medications  Outpatient Medications Marked as Taking for the 10/13/22 encounter (Office Visit) with BEA Szymanski   Medication Sig Dispense Refill    multivit-min-iron-FA-lutein (CENTRUM SILVER WOMEN) 8 mg iron-400 mcg-300 mcg Tab Take 1 tablet by mouth once daily.      [DISCONTINUED] diltiaZEM (CARDIZEM CD) 300 MG 24 hr capsule Take 1 capsule (300 mg total) by mouth once daily. 90 capsule 1    [DISCONTINUED] lisinopriL-hydrochlorothiazide (PRINZIDE,ZESTORETIC) 20-12.5 mg per tablet Take 1 tablet by mouth once daily. 90 tablet 1    [DISCONTINUED] pantoprazole (PROTONIX) 40 MG tablet Take 1 tablet (40 mg total) by mouth once daily. 90 tablet 1       Allergies  Review of patient's allergies indicates:  No Known Allergies    Physical Examination     Vitals:    10/13/22 1014   BP: 120/70   Pulse: 72   Temp: 97.9 °F (36.6 °C)     Physical Exam  Constitutional:       General: She is not in acute distress.  HENT:      Head: Atraumatic.      Nose: Nose normal.      Mouth/Throat:      Mouth: Mucous membranes are moist.   Eyes:      Extraocular Movements: Extraocular movements intact.   Cardiovascular:      Rate and Rhythm: Normal rate.   Pulmonary:      Effort: Pulmonary effort is normal. No respiratory distress.      Breath sounds: Wheezing present.   Abdominal:      General: Bowel sounds are normal.      Palpations: Abdomen is soft.      Tenderness: There is no abdominal tenderness.   Musculoskeletal:         General: Normal range of motion.      Cervical back: Neck supple.   Skin:     General: Skin is warm.   Neurological:      Mental Status: She is alert and oriented to person, place, and time.   Psychiatric:         Mood and Affect: Mood normal.         Imaging / Labs     No visits with results within  1 Day(s) from this visit.   Latest known visit with results is:   Admission on 09/27/2022, Discharged on 10/07/2022   Component Date Value Ref Range Status    POC Glucose 09/28/2022 84  70 - 105 mg/dL Final    POC Glucose 09/29/2022 77  70 - 105 mg/dL Final    POC Glucose 09/30/2022 75  70 - 105 mg/dL Final    POC Glucose 10/01/2022 89  70 - 105 mg/dL Final    Sodium 10/01/2022 138  136 - 145 mmol/L Final    Potassium 10/01/2022 3.6  3.5 - 5.1 mmol/L Final    Chloride 10/01/2022 105  98 - 107 mmol/L Final    CO2 10/01/2022 29  21 - 32 mmol/L Final    Anion Gap 10/01/2022 8  7 - 16 mmol/L Final    Glucose 10/01/2022 103  74 - 106 mg/dL Final    BUN 10/01/2022 14  7 - 18 mg/dL Final    Creatinine 10/01/2022 1.04 (H)  0.55 - 1.02 mg/dL Final    BUN/Creatinine Ratio 10/01/2022 13  6 - 20 Final    Calcium 10/01/2022 8.7  8.5 - 10.1 mg/dL Final    eGFR 10/01/2022 48 (L)  >=60 mL/min/1.73m² Final    POC Glucose 10/02/2022 105  70 - 105 mg/dL Final    POC Glucose 10/03/2022 108 (H)  70 - 105 mg/dL Final    Sodium 10/04/2022 139  136 - 145 mmol/L Final    Potassium 10/04/2022 4.3  3.5 - 5.1 mmol/L Final    Chloride 10/04/2022 106  98 - 107 mmol/L Final    CO2 10/04/2022 30  21 - 32 mmol/L Final    Anion Gap 10/04/2022 7  7 - 16 mmol/L Final    Glucose 10/04/2022 118 (H)  74 - 106 mg/dL Final    BUN 10/04/2022 18  7 - 18 mg/dL Final    Creatinine 10/04/2022 1.12 (H)  0.55 - 1.02 mg/dL Final    BUN/Creatinine Ratio 10/04/2022 16  6 - 20 Final    Calcium 10/04/2022 9.2  8.5 - 10.1 mg/dL Final    eGFR 10/04/2022 44 (L)  >=60 mL/min/1.73m² Final    WBC 10/04/2022 6.16  4.50 - 11.00 K/uL Final    RBC 10/04/2022 3.69 (L)  4.20 - 5.40 M/uL Final    Hemoglobin 10/04/2022 11.6 (L)  12.0 - 16.0 g/dL Final    Hematocrit 10/04/2022 35.9 (L)  38.0 - 47.0 % Final    MCV 10/04/2022 97.3 (H)  80.0 - 96.0 fL Final    MCH 10/04/2022 31.4 (H)  27.0 - 31.0 pg Final    MCHC 10/04/2022 32.3  32.0 - 36.0 g/dL Final    RDW 10/04/2022 13.2  11.5 -  14.5 % Final    Platelet Count 10/04/2022 238  150 - 400 K/uL Final    MPV 10/04/2022 10.1  9.4 - 12.4 fL Final    Neutrophils % 10/04/2022 58.2  53.0 - 65.0 % Final    Lymphocytes % 10/04/2022 29.9  27.0 - 41.0 % Final    Neutrophils, Abs 10/04/2022 3.59  1.80 - 7.70 K/uL Final    Lymphocytes, Absolute 10/04/2022 1.84  1.00 - 4.80 K/uL Final    Diff Type 10/04/2022 Manual   Final    Monocytes % 10/04/2022 10.9 (H)  2.0 - 6.0 % Final    Eosinophils % 10/04/2022 0.8 (L)  1.0 - 4.0 % Final    Basophils % 10/04/2022 0.2  0.0 - 1.0 % Final    Monocytes, Absolute 10/04/2022 0.67  0.00 - 0.80 K/uL Final    Eosinophils, Absolute 10/04/2022 0.05  0.00 - 0.50 K/uL Final    Basophils, Absolute 10/04/2022 0.01  0.00 - 0.20 K/uL Final    Segmented Neutrophils, Man % 10/04/2022 67 (H)  50 - 62 % Final    Lymphocytes, Man % 10/04/2022 21 (L)  27 - 41 % Final    Monocytes, Man % 10/04/2022 12 (H)  2 - 6 % Final    POC Glucose 10/04/2022 99  70 - 105 mg/dL Final    POC Glucose 10/05/2022 112 (H)  70 - 105 mg/dL Final    POC Glucose 10/06/2022 95  70 - 105 mg/dL Final    POC Glucose 10/07/2022 100  70 - 105 mg/dL Final     X-Ray Chest AP Portable  Narrative: EXAMINATION:  XR CHEST AP PORTABLE    CLINICAL HISTORY:  pneumonia;    COMPARISON:  Chest x-ray September 25, 2022    TECHNIQUE:  Frontal view/views of the chest.    FINDINGS:  Continued cardiomegaly.  Mild bibasilar atelectasis/infiltrate and small bilateral pleural fluid.  Visualized osseous and surrounding soft tissue structures appear grossly unchanged.  Impression: As above.    Point of Service: [facility]    Electronically signed by: Reza Walker  Date:    09/27/2022  Time:    10:08    Assessment and Plan (including Health Maintenance)      Problem List  Smart Sets  Document Outside HM   :    Health Maintenance Due   Topic Date Due    COVID-19 Vaccine (1) Never done    Pneumococcal Vaccines (Age 65+) (1 - PCV) Never done    TETANUS VACCINE  Never done     Shingles Vaccine (1 of 2) Never done    Influenza Vaccine (1) Never done       Problem List Items Addressed This Visit          Cardiac/Vascular    Essential hypertension    Relevant Medications    lisinopriL-hydrochlorothiazide (PRINZIDE,ZESTORETIC) 20-12.5 mg per tablet    diltiaZEM (CARDIZEM CD) 300 MG 24 hr capsule       GI    Gastroesophageal reflux disease without esophagitis    Relevant Medications    pantoprazole (PROTONIX) 40 MG tablet     Other Visit Diagnoses       Hospital discharge follow-up    -  Primary          Refill current medications today   is currently following patient   Medications reconciled with hospital discharge   Will get labs next visit due to recent hospital d/c  Follow up 6 mo and prn    Health Maintenance Topics with due status: Not Due       Topic Last Completion Date    Lipid Panel 09/01/2022             Signature:  [facility] [address] [place] [address]  [phone]    Date of encounter: 10/13/22

## 2022-10-27 NOTE — DISCHARGE SUMMARY
[facility]  Hospital Medicine  Discharge Summary      Patient Name: Ajay Bass  MRN: [id number]  Patient Class: IP- Inpatient  Admission Date: 9/23/2022  Hospital Length of Stay: 4 days  Discharge Date and Time: 09/27/2022  Attending Physician: No att. providers found   Discharging Provider: Tiarra Bowie MD  Primary Care Provider: BEA Szymanski      HPI:   Ajay Bass is a 89 yo AAF that  presented to [facility] POV with c/o weakness, possible LOC, and fever on 9-23-22.  Pt s daughter states that when she was giving her mother a bath, she had an episode of LOC.  Daughter also reports that her mother had vomited each time she tried to take sips of water throughout the day.  Mrs. Bass presented to the ED with an oral temp of 102.5 F.  Daughter states she has not received any antipyretic medication.  She also reports her mother is blind and [place].  Seh reports her mother can normally ambulate using her walker with assistance for short distances without distress.    Her CXR revealed an area in RLL that was concerning for pneumonia.  Lab work revealed abnormalities of Cr 1.92 up from 1.46 on 9-1-22, glucose of 216 mg/dl (normally in 120-130's range per records), and Covid +.  CT head without was unremarkable.     Patients daughter states Mrs. Bass has a history of DM, but she has been taken off of all her medications due to her blood sugar dropping too low.  She also reports her mother has a history of HTN which she takes Zestoretic 20/12.5 1 po daily and Cardizem 300mg 1 po daily.  For her history of GERD, she takes Protonix 40mg 1 po daily.      Mrs. Bass will be admitted for treatment of pneumonia and dehydration.  Also she will be monitored for worsening of Covid.    PMH:  HTN, GERD, DM, blindness, hard of hearing    Full Code    Dr. Pepper Tracy was consulted regarding admit.  Originally, Dr. Tracy ordered Remdesivir for treatment of Covid.  Michele Moyer  Pharmacist at [facility] contacted her regarding the use of Remdesivir and pts renal function.  It was decided Remdesivir would be held and kidney functions monitored for potential use.      * No surgery found *      Hospital Course:   09/25/2022: Hospital day #2 for IV Rocephin and Azithromycin for COVID pneumonia. Nursing staff reports that O2 sat dropped into the upper 80s during the night, patient was placed on O2 at 2L/NC and sat improved to 96%. Patient was not started on Remdesivir and Zestoretic was discontinued on admission due to NEFTALY. Patient afebrile, VS stable and O2 sat currently 100% on O2 at 1L/NC. Labs today: wbc 8.83, H&H 12.4/37.8, BUN/CR down to 15/1.48 from 21/1.92 on admission. Patient denies any new complaints.       9/26/2022: Pt combative overnight. Pt removed her IV and pulled off her oxygen. Pt's daughter called to help assist with patient. Pt calmed upon her daughter's arrival and her IV was replaved. Will continue azithromycin and rocephin.     9/27/2022: Pt will be discharged to swing bed today. Continues to improve from covid. Continues to be more calm with daughter in the room. Will continue rocephin and azithroymycin.        Goals of Care Treatment Preferences:  Code Status: Full Code      Consults:   Consults (From admission, onward)        Status Ordering Provider     IP consult to case management  Once        Provider:  (Not yet assigned)    Completed LLUVIA ALEX          No new Assessment & Plan notes have been filed under this hospital service since the last note was generated.  Service: Hospital Medicine    Final Active Diagnoses:    Diagnosis Date Noted POA    PRINCIPAL PROBLEM:  Pneumonia due to infectious organism [J18.9] 09/24/2022 Yes    COVID [U07.1] 09/24/2022 Yes    Dehydration [E86.0] 09/24/2022 Yes    Weakness [R53.1] 09/24/2022 Yes      Problems Resolved During this Admission:       Discharged Condition: stable    Disposition: Swing Bed    Follow Up:    Patient  Instructions:   No discharge procedures on file.    Significant Diagnostic Studies: Labs: BMP: No results for input(s): GLU, NA, K, CL, CO2, BUN, CREATININE, CALCIUM, MG in the last 48 hours. and CBC No results for input(s): WBC, HGB, HCT, PLT in the last 48 hours.    Pending Diagnostic Studies:     None         Medications:  Reconciled Home Medications:      Medication List      ASK your doctor about these medications    CENTRUM SILVER WOMEN 8 mg iron-400 mcg-300 mcg Tab  Generic drug: multivit-min-iron-FA-lutein  Take 1 tablet by mouth once daily.            Indwelling Lines/Drains at time of discharge:   Lines/Drains/Airways     None                 Time spent on the discharge of patient: 30 minutes         Tiarra Bowie MD  Department of Hospital Medicine  [facility]

## 2022-10-31 ENCOUNTER — EXTERNAL CHRONIC CARE MANAGEMENT (OUTPATIENT)
Dept: FAMILY MEDICINE | Facility: CLINIC | Age: 87
End: 2022-10-31
Payer: MEDICARE

## 2022-10-31 PROCEDURE — G0511 PR CHRONIC CARE MGMT, RHC OR FQHC ONLY, 20 MINS OR MORE: ICD-10-PCS | Mod: ,,, | Performed by: FAMILY MEDICINE

## 2022-10-31 PROCEDURE — G0511 CCM/BHI BY RHC/FQHC 20MIN MO: HCPCS | Mod: ,,, | Performed by: FAMILY MEDICINE

## 2022-11-30 ENCOUNTER — EXTERNAL CHRONIC CARE MANAGEMENT (OUTPATIENT)
Dept: FAMILY MEDICINE | Facility: CLINIC | Age: 87
End: 2022-11-30
Payer: MEDICARE

## 2022-11-30 PROCEDURE — G0511 CCM/BHI BY RHC/FQHC 20MIN MO: HCPCS | Mod: ,,, | Performed by: NURSE PRACTITIONER

## 2022-11-30 PROCEDURE — G0511 PR CHRONIC CARE MGMT, RHC OR FQHC ONLY, 20 MINS OR MORE: ICD-10-PCS | Mod: ,,, | Performed by: NURSE PRACTITIONER

## 2022-12-31 ENCOUNTER — EXTERNAL CHRONIC CARE MANAGEMENT (OUTPATIENT)
Dept: FAMILY MEDICINE | Facility: CLINIC | Age: 87
End: 2022-12-31
Payer: MEDICARE

## 2022-12-31 PROCEDURE — G0511 CCM/BHI BY RHC/FQHC 20MIN MO: HCPCS | Mod: ,,, | Performed by: NURSE PRACTITIONER

## 2022-12-31 PROCEDURE — G0511 PR CHRONIC CARE MGMT, RHC OR FQHC ONLY, 20 MINS OR MORE: ICD-10-PCS | Mod: ,,, | Performed by: NURSE PRACTITIONER

## 2023-01-09 PROBLEM — J18.9 PNEUMONIA DUE TO INFECTIOUS ORGANISM: Status: RESOLVED | Noted: 2022-09-24 | Resolved: 2023-01-09

## 2023-01-31 ENCOUNTER — EXTERNAL CHRONIC CARE MANAGEMENT (OUTPATIENT)
Dept: FAMILY MEDICINE | Facility: CLINIC | Age: 88
End: 2023-01-31
Payer: MEDICARE

## 2023-01-31 PROCEDURE — G0511 CCM/BHI BY RHC/FQHC 20MIN MO: HCPCS | Mod: ,,, | Performed by: NURSE PRACTITIONER

## 2023-01-31 PROCEDURE — G0511 PR CHRONIC CARE MGMT, RHC OR FQHC ONLY, 20 MINS OR MORE: ICD-10-PCS | Mod: ,,, | Performed by: NURSE PRACTITIONER

## 2023-02-28 ENCOUNTER — EXTERNAL CHRONIC CARE MANAGEMENT (OUTPATIENT)
Dept: FAMILY MEDICINE | Facility: CLINIC | Age: 88
End: 2023-02-28
Payer: MEDICARE

## 2023-02-28 PROCEDURE — G0511 CCM/BHI BY RHC/FQHC 20MIN MO: HCPCS | Mod: ,,, | Performed by: NURSE PRACTITIONER

## 2023-02-28 PROCEDURE — G0511 PR CHRONIC CARE MGMT, RHC OR FQHC ONLY, 20 MINS OR MORE: ICD-10-PCS | Mod: ,,, | Performed by: NURSE PRACTITIONER

## 2023-03-16 ENCOUNTER — OFFICE VISIT (OUTPATIENT)
Dept: FAMILY MEDICINE | Facility: CLINIC | Age: 88
End: 2023-03-16
Payer: MEDICARE

## 2023-03-16 VITALS
HEIGHT: 64 IN | BODY MASS INDEX: 27.66 KG/M2 | WEIGHT: 162 LBS | OXYGEN SATURATION: 99 % | DIASTOLIC BLOOD PRESSURE: 70 MMHG | RESPIRATION RATE: 18 BRPM | TEMPERATURE: 98 F | HEART RATE: 84 BPM | SYSTOLIC BLOOD PRESSURE: 120 MMHG

## 2023-03-16 DIAGNOSIS — Z00.00 ENCOUNTER FOR PHYSICAL EXAMINATION: Primary | ICD-10-CM

## 2023-03-16 DIAGNOSIS — K59.00 CONSTIPATION, UNSPECIFIED CONSTIPATION TYPE: ICD-10-CM

## 2023-03-16 PROCEDURE — 99213 PR OFFICE/OUTPT VISIT, EST, LEVL III, 20-29 MIN: ICD-10-PCS | Mod: ,,, | Performed by: NURSE PRACTITIONER

## 2023-03-16 PROCEDURE — 99213 OFFICE O/P EST LOW 20 MIN: CPT | Mod: ,,, | Performed by: NURSE PRACTITIONER

## 2023-03-16 RX ORDER — POLYETHYLENE GLYCOL 3350 17 G/17G
17 POWDER, FOR SOLUTION ORAL EVERY OTHER DAY
Qty: 116 G | Refills: 2 | Status: SHIPPED | OUTPATIENT
Start: 2023-03-16

## 2023-03-16 NOTE — PROGRESS NOTES
BEA Szymanski   [facility]  [id number] [place] [id number]  [phone]      PATIENT NAME: Ajay Bass  : 1922  DATE: 3/16/23  MRN: [id number]      Billing Provider: BEA Szymanski  Level of Service:   Patient PCP Information       Provider PCP Type    BEA Szymanski General            Reason for Visit / Chief Complaint: Annual Exam (Pt is here for check up. /Has no complaints. )       Update PCP  Update Chief Complaint         History of Present Illness / Problem Focused Workflow     89 year old female presents for physical exam  Daughter reports she needs physical exam for [facility]    Daughter denies any problems today  She lives with patient and is caregiver  Patient is hard of hearing; reports she is doing well    Requests bedside commode for home use  She has limited mobility and blindness  Also unable to perform ADLs without caregiver  Daughter reports dementia is about the same, worse at times    Hx of  GERD, hypertension, DM    Review of Systems     Review of Systems   Constitutional:  Negative for chills and fever.   HENT:  Positive for hearing loss. Negative for congestion.    Eyes:         Decreased vision related to age   Respiratory:  Negative for cough and shortness of breath.    Cardiovascular:  Negative for chest pain.   Endocrine: Negative for polydipsia and polyuria.   Musculoskeletal:  Positive for arthralgias and gait problem.        Currently in wheelchair; limited mobility     Allergic/Immunologic: Negative for environmental allergies.   Neurological:  Negative for dizziness and headaches.   Psychiatric/Behavioral:  Negative for agitation and dysphoric mood.      Medical / Social / Family History     Past Medical History:   Diagnosis Date    Alzheimer's disease, unspecified (CODE)     Blind     Diabetes mellitus, type 2     GERD (gastroesophageal reflux disease)     [place] (hard of hearing)     Hypertension     Major  depressive disorder, single episode, unspecified        Past Surgical History:   Procedure Laterality Date    TOTAL HIP ARTHROPLASTY         Social History  Ms.  reports that she has never smoked. She has never used smokeless tobacco. She reports that she does not drink alcohol and does not use drugs.    Family History  Ms.'s family history is not on file.    Medications and Allergies     Medications  Outpatient Medications Marked as Taking for the 3/16/23 encounter (Office Visit) with BEA Szymanski   Medication Sig Dispense Refill    diltiaZEM (CARDIZEM CD) 300 MG 24 hr capsule Take 1 capsule (300 mg total) by mouth once daily. 90 capsule 1    lisinopriL-hydrochlorothiazide (PRINZIDE,ZESTORETIC) 20-12.5 mg per tablet Take 1 tablet by mouth once daily. 90 tablet 1    multivit-min-iron-FA-lutein (CENTRUM SILVER WOMEN) 8 mg iron-400 mcg-300 mcg Tab Take 1 tablet by mouth once daily.      pantoprazole (PROTONIX) 40 MG tablet Take 1 tablet (40 mg total) by mouth once daily. 90 tablet 1       Allergies  Review of patient's allergies indicates:  No Known Allergies    Physical Examination     Vitals:    03/16/23 0935   BP: 120/70   Pulse: 84   Resp: 18   Temp: 97.5 °F (36.4 °C)     Physical Exam  Constitutional:       General: She is not in acute distress.  HENT:      Head: Atraumatic.      Nose: Nose normal.      Mouth/Throat:      Mouth: Mucous membranes are moist.   Eyes:      Extraocular Movements: Extraocular movements intact.   Cardiovascular:      Rate and Rhythm: Normal rate.   Pulmonary:      Effort: Pulmonary effort is normal. No respiratory distress.      Breath sounds: Wheezing present.   Abdominal:      General: Bowel sounds are normal.      Palpations: Abdomen is soft.      Tenderness: There is no abdominal tenderness.   Musculoskeletal:         General: Normal range of motion.      Cervical back: Neck supple.   Skin:     General: Skin is warm.   Neurological:      Mental Status: She is alert and  oriented to person, place, and time.   Psychiatric:         Mood and Affect: Mood normal.         Imaging / Labs     No visits with results within 1 Day(s) from this visit.   Latest known visit with results is:   Admission on 09/27/2022, Discharged on 10/07/2022   Component Date Value Ref Range Status    POC Glucose 09/28/2022 84  70 - 105 mg/dL Final    POC Glucose 09/29/2022 77  70 - 105 mg/dL Final    POC Glucose 09/30/2022 75  70 - 105 mg/dL Final    POC Glucose 10/01/2022 89  70 - 105 mg/dL Final    Sodium 10/01/2022 138  136 - 145 mmol/L Final    Potassium 10/01/2022 3.6  3.5 - 5.1 mmol/L Final    Chloride 10/01/2022 105  98 - 107 mmol/L Final    CO2 10/01/2022 29  21 - 32 mmol/L Final    Anion Gap 10/01/2022 8  7 - 16 mmol/L Final    Glucose 10/01/2022 103  74 - 106 mg/dL Final    BUN 10/01/2022 14  7 - 18 mg/dL Final    Creatinine 10/01/2022 1.04 (H)  0.55 - 1.02 mg/dL Final    BUN/Creatinine Ratio 10/01/2022 13  6 - 20 Final    Calcium 10/01/2022 8.7  8.5 - 10.1 mg/dL Final    eGFR 10/01/2022 48 (L)  >=60 mL/min/1.73m² Final    POC Glucose 10/02/2022 105  70 - 105 mg/dL Final    POC Glucose 10/03/2022 108 (H)  70 - 105 mg/dL Final    Sodium 10/04/2022 139  136 - 145 mmol/L Final    Potassium 10/04/2022 4.3  3.5 - 5.1 mmol/L Final    Chloride 10/04/2022 106  98 - 107 mmol/L Final    CO2 10/04/2022 30  21 - 32 mmol/L Final    Anion Gap 10/04/2022 7  7 - 16 mmol/L Final    Glucose 10/04/2022 118 (H)  74 - 106 mg/dL Final    BUN 10/04/2022 18  7 - 18 mg/dL Final    Creatinine 10/04/2022 1.12 (H)  0.55 - 1.02 mg/dL Final    BUN/Creatinine Ratio 10/04/2022 16  6 - 20 Final    Calcium 10/04/2022 9.2  8.5 - 10.1 mg/dL Final    eGFR 10/04/2022 44 (L)  >=60 mL/min/1.73m² Final    WBC 10/04/2022 6.16  4.50 - 11.00 K/uL Final    RBC 10/04/2022 3.69 (L)  4.20 - 5.40 M/uL Final    Hemoglobin 10/04/2022 11.6 (L)  12.0 - 16.0 g/dL Final    Hematocrit 10/04/2022 35.9 (L)  38.0 - 47.0 % Final    MCV 10/04/2022 97.3 (H)  80.0  - 96.0 fL Final    MCH 10/04/2022 31.4 (H)  27.0 - 31.0 pg Final    MCHC 10/04/2022 32.3  32.0 - 36.0 g/dL Final    RDW 10/04/2022 13.2  11.5 - 14.5 % Final    Platelet Count 10/04/2022 238  150 - 400 K/uL Final    MPV 10/04/2022 10.1  9.4 - 12.4 fL Final    Neutrophils % 10/04/2022 58.2  53.0 - 65.0 % Final    Lymphocytes % 10/04/2022 29.9  27.0 - 41.0 % Final    Neutrophils, Abs 10/04/2022 3.59  1.80 - 7.70 K/uL Final    Lymphocytes, Absolute 10/04/2022 1.84  1.00 - 4.80 K/uL Final    Diff Type 10/04/2022 Manual   Final    Monocytes % 10/04/2022 10.9 (H)  2.0 - 6.0 % Final    Eosinophils % 10/04/2022 0.8 (L)  1.0 - 4.0 % Final    Basophils % 10/04/2022 0.2  0.0 - 1.0 % Final    Monocytes, Absolute 10/04/2022 0.67  0.00 - 0.80 K/uL Final    Eosinophils, Absolute 10/04/2022 0.05  0.00 - 0.50 K/uL Final    Basophils, Absolute 10/04/2022 0.01  0.00 - 0.20 K/uL Final    Segmented Neutrophils, Man % 10/04/2022 67 (H)  50 - 62 % Final    Lymphocytes, Man % 10/04/2022 21 (L)  27 - 41 % Final    Monocytes, Man % 10/04/2022 12 (H)  2 - 6 % Final    POC Glucose 10/04/2022 99  70 - 105 mg/dL Final    POC Glucose 10/05/2022 112 (H)  70 - 105 mg/dL Final    POC Glucose 10/06/2022 95  70 - 105 mg/dL Final    POC Glucose 10/07/2022 100  70 - 105 mg/dL Final     X-Ray Chest AP Portable  Narrative: EXAMINATION:  XR CHEST AP PORTABLE    CLINICAL HISTORY:  pneumonia;    COMPARISON:  Chest x-ray September 25, 2022    TECHNIQUE:  Frontal view/views of the chest.    FINDINGS:  Continued cardiomegaly.  Mild bibasilar atelectasis/infiltrate and small bilateral pleural fluid.  Visualized osseous and surrounding soft tissue structures appear grossly unchanged.  Impression: As above.    Point of Service: [facility]    Electronically signed by: Reza Walker  Date:    09/27/2022  Time:    10:08      Assessment and Plan (including Health Maintenance)      Problem List  Smart Sets  Document Outside HM   :    Health Maintenance Due    Topic Date Due    COVID-19 Vaccine (1) Never done    Pneumococcal Vaccines (Age 65+) (1 - PCV) Never done    TETANUS VACCINE  Never done    Shingles Vaccine (1 of 2) Never done       Problem List Items Addressed This Visit    None  Visit Diagnoses       Encounter for physical examination    -  Primary    Constipation, unspecified constipation type        Relevant Medications    polyethylene glycol (GLYCOLAX) 17 gram/dose powder        Will get paperwork done once form is presented  Treat for constipation today  Increase water intake as tolerated  Follow up about 6 mo    Health Maintenance Topics with due status: Not Due       Topic Last Completion Date    Hemoglobin A1c (Prediabetes) 09/01/2022    Lipid Panel 09/01/2022             Signature:  BEA Szymanski  [facility] [address] [place] [address]  [phone]    Date of encounter: 3/16/23

## 2023-03-31 ENCOUNTER — EXTERNAL CHRONIC CARE MANAGEMENT (OUTPATIENT)
Dept: FAMILY MEDICINE | Facility: CLINIC | Age: 88
End: 2023-03-31
Payer: MEDICARE

## 2023-03-31 PROCEDURE — G0511 CCM/BHI BY RHC/FQHC 20MIN MO: HCPCS | Mod: ,,, | Performed by: NURSE PRACTITIONER

## 2023-03-31 PROCEDURE — G0511 PR CHRONIC CARE MGMT, RHC OR FQHC ONLY, 20 MINS OR MORE: ICD-10-PCS | Mod: ,,, | Performed by: NURSE PRACTITIONER

## 2023-04-13 DIAGNOSIS — R53.1 WEAKNESS: Primary | ICD-10-CM

## 2023-04-30 ENCOUNTER — EXTERNAL CHRONIC CARE MANAGEMENT (OUTPATIENT)
Dept: FAMILY MEDICINE | Facility: CLINIC | Age: 88
End: 2023-04-30
Payer: MEDICARE

## 2023-04-30 PROCEDURE — G0511 PR CHRONIC CARE MGMT, RHC OR FQHC ONLY, 20 MINS OR MORE: ICD-10-PCS | Mod: ,,, | Performed by: NURSE PRACTITIONER

## 2023-04-30 PROCEDURE — G0511 CCM/BHI BY RHC/FQHC 20MIN MO: HCPCS | Mod: ,,, | Performed by: NURSE PRACTITIONER

## 2023-05-31 ENCOUNTER — EXTERNAL CHRONIC CARE MANAGEMENT (OUTPATIENT)
Dept: FAMILY MEDICINE | Facility: CLINIC | Age: 88
End: 2023-05-31
Payer: MEDICARE

## 2023-05-31 PROCEDURE — G0511 PR CHRONIC CARE MGMT, RHC OR FQHC ONLY, 20 MINS OR MORE: ICD-10-PCS | Mod: ,,, | Performed by: NURSE PRACTITIONER

## 2023-05-31 PROCEDURE — G0511 CCM/BHI BY RHC/FQHC 20MIN MO: HCPCS | Mod: ,,, | Performed by: NURSE PRACTITIONER

## 2023-06-30 ENCOUNTER — EXTERNAL CHRONIC CARE MANAGEMENT (OUTPATIENT)
Dept: FAMILY MEDICINE | Facility: CLINIC | Age: 88
End: 2023-06-30
Payer: MEDICARE

## 2023-06-30 PROCEDURE — G0511 CCM/BHI BY RHC/FQHC 20MIN MO: HCPCS | Mod: ,,, | Performed by: NURSE PRACTITIONER

## 2023-06-30 PROCEDURE — G0511 PR CHRONIC CARE MGMT, RHC OR FQHC ONLY, 20 MINS OR MORE: ICD-10-PCS | Mod: ,,, | Performed by: NURSE PRACTITIONER

## 2023-07-31 ENCOUNTER — EXTERNAL CHRONIC CARE MANAGEMENT (OUTPATIENT)
Dept: FAMILY MEDICINE | Facility: CLINIC | Age: 88
End: 2023-07-31
Payer: MEDICARE

## 2023-07-31 PROCEDURE — G0511 PR CHRONIC CARE MGMT, RHC OR FQHC ONLY, 20 MINS OR MORE: ICD-10-PCS | Mod: ,,, | Performed by: NURSE PRACTITIONER

## 2023-07-31 PROCEDURE — G0511 CCM/BHI BY RHC/FQHC 20MIN MO: HCPCS | Mod: ,,, | Performed by: NURSE PRACTITIONER

## 2023-08-07 DIAGNOSIS — I10 ESSENTIAL HYPERTENSION: ICD-10-CM

## 2023-08-07 DIAGNOSIS — K21.9 GASTROESOPHAGEAL REFLUX DISEASE WITHOUT ESOPHAGITIS: ICD-10-CM

## 2023-08-07 RX ORDER — PANTOPRAZOLE SODIUM 40 MG/1
40 TABLET, DELAYED RELEASE ORAL DAILY
Qty: 90 TABLET | Refills: 1 | Status: SHIPPED | OUTPATIENT
Start: 2023-08-07 | End: 2024-02-03

## 2023-08-07 RX ORDER — DILTIAZEM HYDROCHLORIDE 300 MG/1
300 CAPSULE, COATED, EXTENDED RELEASE ORAL DAILY
Qty: 90 CAPSULE | Refills: 1 | Status: SHIPPED | OUTPATIENT
Start: 2023-08-07 | End: 2024-02-03

## 2023-08-07 RX ORDER — LISINOPRIL AND HYDROCHLOROTHIAZIDE 12.5; 2 MG/1; MG/1
1 TABLET ORAL DAILY
Qty: 90 TABLET | Refills: 1 | Status: SHIPPED | OUTPATIENT
Start: 2023-08-07 | End: 2024-02-03

## 2023-08-31 ENCOUNTER — EXTERNAL CHRONIC CARE MANAGEMENT (OUTPATIENT)
Dept: FAMILY MEDICINE | Facility: CLINIC | Age: 88
End: 2023-08-31
Payer: MEDICARE

## 2023-08-31 PROCEDURE — G0511 PR CHRONIC CARE MGMT, RHC OR FQHC ONLY, 20 MINS OR MORE: ICD-10-PCS | Mod: ,,, | Performed by: NURSE PRACTITIONER

## 2023-08-31 PROCEDURE — G0511 CCM/BHI BY RHC/FQHC 20MIN MO: HCPCS | Mod: ,,, | Performed by: NURSE PRACTITIONER

## 2023-09-30 ENCOUNTER — EXTERNAL CHRONIC CARE MANAGEMENT (OUTPATIENT)
Dept: FAMILY MEDICINE | Facility: CLINIC | Age: 88
End: 2023-09-30
Payer: MEDICARE

## 2023-09-30 PROCEDURE — G0511 CCM/BHI BY RHC/FQHC 20MIN MO: HCPCS | Mod: ,,, | Performed by: NURSE PRACTITIONER

## 2023-09-30 PROCEDURE — G0511 PR CHRONIC CARE MGMT, RHC OR FQHC ONLY, 20 MINS OR MORE: ICD-10-PCS | Mod: ,,, | Performed by: NURSE PRACTITIONER

## 2023-10-31 ENCOUNTER — EXTERNAL CHRONIC CARE MANAGEMENT (OUTPATIENT)
Dept: FAMILY MEDICINE | Facility: CLINIC | Age: 88
End: 2023-10-31
Payer: MEDICARE

## 2023-10-31 PROCEDURE — G0511 CCM/BHI BY RHC/FQHC 20MIN MO: HCPCS | Mod: ,,, | Performed by: NURSE PRACTITIONER

## 2023-10-31 PROCEDURE — G0511 PR CHRONIC CARE MGMT, RHC OR FQHC ONLY, 20 MINS OR MORE: ICD-10-PCS | Mod: ,,, | Performed by: NURSE PRACTITIONER

## 2023-11-30 ENCOUNTER — EXTERNAL CHRONIC CARE MANAGEMENT (OUTPATIENT)
Dept: FAMILY MEDICINE | Facility: CLINIC | Age: 88
End: 2023-11-30
Payer: MEDICARE

## 2023-11-30 PROCEDURE — G0511 CCM/BHI BY RHC/FQHC 20MIN MO: HCPCS | Mod: ,,, | Performed by: NURSE PRACTITIONER

## 2023-11-30 PROCEDURE — G0511 PR CHRONIC CARE MGMT, RHC OR FQHC ONLY, 20 MINS OR MORE: ICD-10-PCS | Mod: ,,, | Performed by: NURSE PRACTITIONER

## 2023-12-23 ENCOUNTER — HOSPITAL ENCOUNTER (EMERGENCY)
Facility: HOSPITAL | Age: 88
Discharge: HOME | End: 2023-12-23
Payer: MEDICARE

## 2023-12-23 VITALS
BODY MASS INDEX: 29.02 KG/M2 | OXYGEN SATURATION: 100 % | WEIGHT: 170 LBS | RESPIRATION RATE: 18 BRPM | HEART RATE: 98 BPM | SYSTOLIC BLOOD PRESSURE: 171 MMHG | DIASTOLIC BLOOD PRESSURE: 103 MMHG | HEIGHT: 64 IN | TEMPERATURE: 98 F

## 2023-12-23 DIAGNOSIS — F02.C0 SEVERE ALZHEIMER'S DEMENTIA WITHOUT BEHAVIORAL DISTURBANCE, PSYCHOTIC DISTURBANCE, MOOD DISTURBANCE, OR ANXIETY, UNSPECIFIED TIMING OF DEMENTIA ONSET: ICD-10-CM

## 2023-12-23 DIAGNOSIS — R44.3 HALLUCINATIONS: Primary | ICD-10-CM

## 2023-12-23 DIAGNOSIS — R03.0 ELEVATED BLOOD PRESSURE READING: ICD-10-CM

## 2023-12-23 DIAGNOSIS — G30.9 SEVERE ALZHEIMER'S DEMENTIA WITHOUT BEHAVIORAL DISTURBANCE, PSYCHOTIC DISTURBANCE, MOOD DISTURBANCE, OR ANXIETY, UNSPECIFIED TIMING OF DEMENTIA ONSET: ICD-10-CM

## 2023-12-23 LAB
ALBUMIN SERPL BCP-MCNC: 2.8 G/DL (ref 3.5–5)
ALBUMIN/GLOB SERPL: 0.6 {RATIO}
ALP SERPL-CCNC: 78 U/L (ref 55–142)
ALT SERPL W P-5'-P-CCNC: 12 U/L (ref 13–56)
ANION GAP SERPL CALCULATED.3IONS-SCNC: 15 MMOL/L (ref 7–16)
AST SERPL W P-5'-P-CCNC: 22 U/L (ref 15–37)
BASOPHILS # BLD AUTO: 0.03 K/UL (ref 0–0.2)
BASOPHILS NFR BLD AUTO: 0.4 % (ref 0–1)
BILIRUB SERPL-MCNC: 0.7 MG/DL (ref ?–1.2)
BUN SERPL-MCNC: 14 MG/DL (ref 7–18)
BUN/CREAT SERPL: 16 (ref 6–20)
CALCIUM SERPL-MCNC: 8.9 MG/DL (ref 8.5–10.1)
CHLORIDE SERPL-SCNC: 103 MMOL/L (ref 98–107)
CO2 SERPL-SCNC: 24 MMOL/L (ref 21–32)
CREAT SERPL-MCNC: 0.87 MG/DL (ref 0.55–1.02)
DIFFERENTIAL METHOD BLD: ABNORMAL
EGFR (NO RACE VARIABLE) (RUSH/TITUS): 59 ML/MIN/1.73M2
EOSINOPHIL # BLD AUTO: 0.01 K/UL (ref 0–0.5)
EOSINOPHIL NFR BLD AUTO: 0.1 % (ref 1–4)
ERYTHROCYTE [DISTWIDTH] IN BLOOD BY AUTOMATED COUNT: 14.9 % (ref 11.5–14.5)
GLOBULIN SER-MCNC: 4.4 G/DL (ref 2–4)
GLUCOSE SERPL-MCNC: 129 MG/DL (ref 74–106)
HCT VFR BLD AUTO: 43.3 % (ref 38–47)
HGB BLD-MCNC: 14 G/DL (ref 12–16)
LACTATE SERPL-SCNC: 1.2 MMOL/L (ref 0.4–2)
LYMPHOCYTES # BLD AUTO: 1.75 K/UL (ref 1–4.8)
LYMPHOCYTES NFR BLD AUTO: 20.7 % (ref 27–41)
MCH RBC QN AUTO: 31.9 PG (ref 27–31)
MCHC RBC AUTO-ENTMCNC: 32.3 G/DL (ref 32–36)
MCV RBC AUTO: 98.6 FL (ref 80–96)
MONOCYTES # BLD AUTO: 0.68 K/UL (ref 0–0.8)
MONOCYTES NFR BLD AUTO: 8 % (ref 2–6)
MPC BLD CALC-MCNC: 10.7 FL (ref 9.4–12.4)
NEUTROPHILS # BLD AUTO: 5.99 K/UL (ref 1.8–7.7)
NEUTROPHILS NFR BLD AUTO: 70.8 % (ref 53–65)
PLATELET # BLD AUTO: 236 K/UL (ref 150–400)
POTASSIUM SERPL-SCNC: 3.4 MMOL/L (ref 3.5–5.1)
PROT SERPL-MCNC: 7.2 G/DL (ref 6.4–8.2)
RBC # BLD AUTO: 4.39 M/UL (ref 4.2–5.4)
SODIUM SERPL-SCNC: 139 MMOL/L (ref 136–145)
WBC # BLD AUTO: 8.46 K/UL (ref 4.5–11)

## 2023-12-23 PROCEDURE — 85025 COMPLETE CBC W/AUTO DIFF WBC: CPT | Performed by: NURSE PRACTITIONER

## 2023-12-23 PROCEDURE — 93010 EKG 12-LEAD: ICD-10-PCS | Mod: ,,, | Performed by: HOSPITALIST

## 2023-12-23 PROCEDURE — 83605 ASSAY OF LACTIC ACID: CPT | Performed by: NURSE PRACTITIONER

## 2023-12-23 PROCEDURE — 99284 EMERGENCY DEPT VISIT MOD MDM: CPT | Mod: GF,GW

## 2023-12-23 PROCEDURE — 80053 COMPREHEN METABOLIC PANEL: CPT | Performed by: NURSE PRACTITIONER

## 2023-12-23 PROCEDURE — 99285 EMERGENCY DEPT VISIT HI MDM: CPT | Mod: 25

## 2023-12-23 PROCEDURE — 93005 ELECTROCARDIOGRAM TRACING: CPT

## 2023-12-23 PROCEDURE — 93010 ELECTROCARDIOGRAM REPORT: CPT | Mod: GW,ICN,, | Performed by: HOSPITALIST

## 2023-12-23 NOTE — ED NOTES
CALLED [facility] FOR TRANSPORT BACK HOME. DAUGHTER LEFT TO GO HOME TO BE THERE WHEN PT GETS HOME. DC INSTRUCTIONS GIVEN TO DAUGHTER.   
PT PICKED UP BY EMS FOR TRANSPORT BACK HOME.   
Yes

## 2023-12-23 NOTE — ED PROVIDER NOTES
Encounter Date: 12/23/2023       History     Chief Complaint   Patient presents with    Failure To Thrive    Hallucinations     89 y/o AAF with PMHx of DM2, Alzheimer's Dementia, HTN, GERD, Blind, [place] and Depression arrived to the ED via EMS with complaint of hallucinations per patient's daughter that started around 10:30 am yesterday. Patient ate breakfast yesterday morning, but has not eaten anything since then. Will drink very little. Patient is on [facility] who obtained urine sample, but her daughter has not heard from results. Became concerned and called EMS to bring patient into the ED for evaluation. Patient is talkative, but does not answer questions appropriately.  Patient's daughter, Rach is present in room.     The history is provided by the EMS personnel and a relative.     Review of patient's allergies indicates:  No Known Allergies  Past Medical History:   Diagnosis Date    Alzheimer's disease, unspecified (CODE)     Blind     Diabetes mellitus, type 2     GERD (gastroesophageal reflux disease)     [place] (hard of hearing)     Hypertension     Major depressive disorder, single episode, unspecified      Past Surgical History:   Procedure Laterality Date    TOTAL HIP ARTHROPLASTY       History reviewed. No pertinent family history.  Social History     Tobacco Use    Smoking status: Never    Smokeless tobacco: Never   Substance Use Topics    Alcohol use: Never    Drug use: Never     Review of Systems   Unable to perform ROS: Dementia   Constitutional:  Appetite change: decreased.       Physical Exam     Initial Vitals [12/23/23 0840]   BP Pulse Resp Temp SpO2   (!) 181/96 90 18 97.9 °F (36.6 °C) 97 %      MAP       --         Physical Exam    Nursing note and vitals reviewed.  Constitutional: She appears well-developed and well-nourished.  Non-toxic appearance. She does not have a sickly appearance. She does not appear ill. No distress.   HENT:   Head: Normocephalic and atraumatic.   Right Ear:  Tympanic membrane, external ear and ear canal normal.   Left Ear: Tympanic membrane, external ear and ear canal normal.   Nose: Nose normal.   Mouth/Throat: Uvula is midline, oropharynx is clear and moist and mucous membranes are normal.   [place]   Eyes: Conjunctivae and lids are normal. Pupils are equal, round, and reactive to light.   Neck: Neck supple.   Normal range of motion.  Cardiovascular:  Normal rate, regular rhythm, normal heart sounds and intact distal pulses.           No edema to BLE   Pulmonary/Chest: Effort normal and breath sounds normal. No respiratory distress.   Abdominal: Abdomen is soft and flat. Bowel sounds are normal. There is no abdominal tenderness.   Musculoskeletal:         General: Normal range of motion.      Cervical back: Normal range of motion and neck supple.     Lymphadenopathy:     She has no cervical adenopathy.   Neurological: She is alert. She is disoriented.   Skin: Skin is warm and dry. Capillary refill takes less than 2 seconds.   Psychiatric: She has a normal mood and affect. Her behavior is normal. Thought content normal.         Medical Screening Exam   See Full Note    ED Course   Procedures  Labs Reviewed   COMPREHENSIVE METABOLIC PANEL - Abnormal; Notable for the following components:       Result Value    Potassium 3.4 (*)     Glucose 129 (*)     Albumin 2.8 (*)     Globulin 4.4 (*)     ALT 12 (*)     eGFR 59 (*)     All other components within normal limits   CBC WITH DIFFERENTIAL - Abnormal; Notable for the following components:    MCV 98.6 (*)     MCH 31.9 (*)     RDW 14.9 (*)     Neutrophils % 70.8 (*)     Lymphocytes % 20.7 (*)     Monocytes % 8.0 (*)     Eosinophils % 0.1 (*)     All other components within normal limits   LACTIC ACID, PLASMA - Normal   CBC W/ AUTO DIFFERENTIAL    Narrative:     The following orders were created for panel order CBC auto differential.  Procedure                               Abnormality         Status                     ---------                                -----------         ------                     CBC with Differential[[id number]]       Abnormal            Final result                 Please view results for these tests on the individual orders.        ECG Results              EKG 12-lead (In process)  Result time 12/23/23 09:07:00      In process by Interface, Lab In [facility] (12/23/23 09:07:00)                   Narrative:    Test Reason : R03.0,    Vent. Rate : 094 BPM     Atrial Rate : 094 BPM     P-R Int : 180 ms          QRS Dur : 130 ms      QT Int : 412 ms       P-R-T Axes : 088 -87 081 degrees     QTc Int : 515 ms    Sinus rhythm with occasional Premature ventricular complexes  Left axis deviation  Nonspecific intraventricular block  Abnormal ECG  When compared with ECG of 23-SEP-2022 21:49,  Premature ventricular complexes are now Present  Nonspecific intraventricular block has replaced Right bundle branch block    Referred By: [email or web address]   SELF           Confirmed By:                                   Imaging Results              X-Ray Chest AP Portable (Final result)  Result time 12/23/23 11:03:42      Final result by Reza Walker DO (12/23/23 11:03:42)                   Impression:      As above.    Point of Service: [facility]      Electronically signed by: Reza Walker  Date:    12/23/2023  Time:    11:03               Narrative:    EXAMINATION:  XR CHEST AP PORTABLE    CLINICAL HISTORY:  elevated blood pressure;    COMPARISON:  Chest x-ray September 27, 2022    TECHNIQUE:  Frontal view/views of the chest.    FINDINGS:  Mild cardiomegaly.  Ground-glass infiltrate/edema within the right greater than left lung.  Layering pleural fluid suspected.  Constellation of findings suspicious for CHF. Underlying infection not excluded.  Visualized osseous and surrounding soft tissue structures appear grossly unchanged.                                       Medications - No data to display  Medical Decision  Making  89 y/o AAF with PMHx of DM2, Alzheimer's Dementia, HTN, GERD, Blind, [place] and Depression arrived to the ED via EMS with complaint of hallucinations per patient's daughter that started around 10:30 am yesterday. Patient ate breakfast yesterday morning, but has not eaten anything since then. Will drink very little. Patient is on [facility] who obtained urine sample, but her daughter has not heard from results. Became concerned and called EMS to bring patient into the ED for evaluation. Patient is talkative, but does not answer questions appropriately.  Patient's daughterRach is present in room.     Amount and/or Complexity of Data Reviewed  Labs: ordered.     Details: Labs Reviewed  COMPREHENSIVE METABOLIC PANEL - Abnormal; Notable for the following components:     Potassium                     3.4 (*)                Glucose                       129 (*)                Albumin                       2.8 (*)                Globulin                      4.4 (*)                ALT                           12 (*)                 eGFR                          59 (*)              All other components within normal limits  CBC WITH DIFFERENTIAL - Abnormal; Notable for the following components:     MCV                           98.6 (*)               MCH                           31.9 (*)               RDW                           14.9 (*)               Neutrophils %                 70.8 (*)               Lymphocytes %                 20.7 (*)               Monocytes %                   8.0 (*)                Eosinophils %                 0.1 (*)             All other components within normal limits  LACTIC ACID, PLASMA - Normal  CBC W/ AUTO DIFFERENTIAL   Radiology: ordered.     Details: CXR: Mild cardiomegaly.  Ground-glass infiltrate/edema within the right greater than left lung.  Layering pleural fluid suspected.  Constellation of findings suspicious for CHF. Underlying infection not excluded.   Visualized osseous and surrounding soft tissue structures appear grossly unchanged.  ECG/medicine tests: ordered.     Details: EKG: Sinus Rhythm with Occasional premature ventricular complexes. Non-specific intraventricular block. QTc prolonged 515.  Discussion of management or test interpretation with external provider(s): Discharge MDM  I discussed labs and CXR results with Ms Loyd (daughter). Hallucinations and decreased appetite appears to be related to Alzheimer Dementia. O2 sat 100% on room air, Afebrile and wbc wnl.  No edema to BLE, no abdominal distention and no cough. CXR results most likely reflects CHF. Discussed treatment with lasix and possible complications that could lead to dehydration and acute on chronic kidney disease. Patient's daughter does no want her to have lasix started. She wishes to be discharged back home on hospice.Hospice had started Ativan for restlessness, but patient only had one dose. Ms Loyd did not think medication helped. Instructed her to contact hospice to discuss medication dosage and if medication needs to be increased.   Patient was discharged in stable condition.  Detailed return precautions discussed. Ms Loyd (daughter) agreed to treatment plan and verbalized understanding.                                      Clinical Impression:   Final diagnoses:  [R03.0] Elevated blood pressure reading  [R44.3] Hallucinations (Primary)  [G30.9, F02.C0] Severe Alzheimer's dementia without behavioral disturbance, psychotic disturbance, mood disturbance, or anxiety, unspecified timing of dementia onset        ED Disposition Condition    Discharge Stable          ED Prescriptions    None       Follow-up Information    None          Jo Felix, BEA  12/23/23 1141       Jo Felix, [facility]  12/23/23 1150

## 2023-12-31 ENCOUNTER — EXTERNAL CHRONIC CARE MANAGEMENT (OUTPATIENT)
Dept: FAMILY MEDICINE | Facility: CLINIC | Age: 88
End: 2023-12-31
Payer: MEDICARE

## 2023-12-31 PROCEDURE — G0511 CCM/BHI BY RHC/FQHC 20MIN MO: HCPCS | Mod: ,,, | Performed by: NURSE PRACTITIONER

## 2024-01-31 ENCOUNTER — EXTERNAL CHRONIC CARE MANAGEMENT (OUTPATIENT)
Dept: FAMILY MEDICINE | Facility: CLINIC | Age: 89
End: 2024-01-31
Payer: MEDICARE

## 2024-01-31 PROCEDURE — G0511 CCM/BHI BY RHC/FQHC 20MIN MO: HCPCS | Mod: GW,,, | Performed by: NURSE PRACTITIONER

## 2024-02-27 LAB
OHS QRS DURATION: 130 MS
OHS QTC CALCULATION: 515 MS